# Patient Record
Sex: FEMALE | Race: WHITE | NOT HISPANIC OR LATINO | Employment: FULL TIME | URBAN - METROPOLITAN AREA
[De-identification: names, ages, dates, MRNs, and addresses within clinical notes are randomized per-mention and may not be internally consistent; named-entity substitution may affect disease eponyms.]

---

## 2022-06-20 PROCEDURE — 87086 URINE CULTURE/COLONY COUNT: CPT

## 2022-09-01 ENCOUNTER — TRANSCRIBE ORDERS (OUTPATIENT)
Dept: ADMINISTRATIVE | Facility: HOSPITAL | Age: 52
End: 2022-09-01

## 2022-09-01 DIAGNOSIS — Z12.31 SCREENING MAMMOGRAM FOR BREAST CANCER: Primary | ICD-10-CM

## 2023-02-13 ENCOUNTER — OFFICE VISIT (OUTPATIENT)
Dept: ORTHOPEDIC SURGERY | Facility: CLINIC | Age: 53
End: 2023-02-13
Payer: OTHER MISCELLANEOUS

## 2023-02-13 VITALS — HEART RATE: 78 BPM | WEIGHT: 250 LBS | HEIGHT: 67 IN | BODY MASS INDEX: 39.24 KG/M2

## 2023-02-13 DIAGNOSIS — M25.511 ACUTE PAIN OF RIGHT SHOULDER: Primary | ICD-10-CM

## 2023-02-13 PROCEDURE — 99203 OFFICE O/P NEW LOW 30 MIN: CPT | Performed by: ORTHOPAEDIC SURGERY

## 2023-02-13 RX ORDER — CYCLOBENZAPRINE HCL 10 MG
TABLET ORAL
COMMUNITY
Start: 2023-02-06 | End: 2023-03-31 | Stop reason: HOSPADM

## 2023-02-13 RX ORDER — ORAL SEMAGLUTIDE 7 MG/1
TABLET ORAL
COMMUNITY
Start: 2022-12-16

## 2023-02-13 RX ORDER — HYDROXYZINE HYDROCHLORIDE 25 MG/1
TABLET, FILM COATED ORAL
COMMUNITY
Start: 2022-05-11

## 2023-02-13 RX ORDER — MELOXICAM 15 MG/1
15 TABLET ORAL DAILY PRN
Qty: 30 TABLET | Refills: 4 | Status: SHIPPED | OUTPATIENT
Start: 2023-02-13 | End: 2023-03-31 | Stop reason: HOSPADM

## 2023-02-13 NOTE — PROGRESS NOTES
"     Patient ID: Amanda Guzman is a 52 y.o. female.    Chief Complaint:    Chief Complaint   Patient presents with   • Right Shoulder - Pain, Initial Evaluation     Pain 8 DOI 1/30/2023       HPI:  This is a 52-year-old female who fell at work on January 30 injuring her right arm.  She feels like she cannot lift her arm.  She has pain from her shoulder into her bicep into her arm and wrist.  She has been taking oral medication and been on light duty  Past Medical History:   Diagnosis Date   • Asthma    • Depression        Past Surgical History:   Procedure Laterality Date   • CHOLECYSTECTOMY     • NASAL SINUS SURGERY Bilateral 2017       History reviewed. No pertinent family history.       Social History     Occupational History   • Not on file   Tobacco Use   • Smoking status: Never   • Smokeless tobacco: Never   Vaping Use   • Vaping Use: Never used   Substance and Sexual Activity   • Alcohol use: Yes     Comment: occassionally   • Drug use: Defer   • Sexual activity: Defer      Review of Systems   Cardiovascular: Negative for chest pain.   Musculoskeletal: Positive for arthralgias.       Objective:    Pulse 78   Ht 170.2 cm (67\")   Wt 113 kg (250 lb)   BMI 39.16 kg/m²     Physical Examination:  Right arm demonstrates intact skin no deformity she has pain to essentially palpation of all bony prominences of the right upper extremity she has full range of motion the elbow wrist and digits she has passive shoulder elevation of 150 abduction 120 external rotation 30 with pain and weakness on Speed, [place], supraspinatus testing.  Belly press and liftoff are 4/5.  Sensory and motor exam are intact all distributions. Radial pulse is palpable and capillary refill is less than two seconds to all digits.    Imaging:  Prior x-rays of the shoulder humerus elbow and wrist are reviewed by interpretation is of no fracture well-maintained joint spaces    Assessment:  Right shoulder pain possible cuff tear    Plan:  I recommend " MRI      Procedures         Disclaimer: Part of this note may be an electronic transcription/translation of spoken language to printed text using the Dragon Dictation System

## 2023-02-13 NOTE — PATIENT INSTRUCTIONS
MRI follow-up instructions    Today at your office visit, Dr. Goode recommended an MRI (magnetic resonance imaging) to evaluate your joint pain.  This requires a precertification process, which our office will do, and then we will contact you when it is approved and go over scheduling options.  We typically recommend these to be performed at [facility] or [facility].  If for some reason it is performed elsewhere please arrange to have that facility give you a disc with your images on it so Dr. Goode can review it at your follow-up visit.    When checking out today we recommend making an appointment to go over your results in approximately two weeks.  If your MRI is done sooner than that we would be happy to schedule you sooner to go over your results, just contact us at [phone] or through the [employer] portal to let us know your MRI is completed.  Seeing you in person for the results gives us the best opportunity to look at your images together and explain the diagnosis and treatment options to best help you.

## 2023-02-14 ENCOUNTER — TELEPHONE (OUTPATIENT)
Dept: ORTHOPEDIC SURGERY | Facility: CLINIC | Age: 53
End: 2023-02-14

## 2023-02-14 NOTE — TELEPHONE ENCOUNTER
Caller: Amanda Guzman    Relationship: Self    Best call back number:     What is the best time to reach you: ANY    Who are you requesting to speak with (clinical staff, provider,  specific staff member): CLINICAL    What was the call regarding: PATIENT CALL;ED ASKING IF WE HAVE [employer] COMP APPROVAL TO SCHEDULE MRI.  SHE ASKED IF WE HAD FAX YET.   I ADVISED I DONT HAVE THAT INFO.  SHE IS THINKING THAT WE HAD TO SEND FAX TO [employer] TO GET APPROVAL BUT TRIED TO EXPLAIN [employer] USUALLY SENDS US APPROVALS    Do you require a callback: YES

## 2023-02-15 ENCOUNTER — TELEPHONE (OUTPATIENT)
Dept: ORTHOPEDIC SURGERY | Facility: CLINIC | Age: 53
End: 2023-02-15
Payer: COMMERCIAL

## 2023-02-15 NOTE — TELEPHONE ENCOUNTER
PATIENT LVM SO I RETURNED PATIENTS CALL. CHASE THAT I DID GET HER VM AND WAS CALLING TO SCHEDULE MRI FOLLOW UP AND TO CALL US [phone].

## 2023-02-15 NOTE — TELEPHONE ENCOUNTER
Caller: ANIYAH ZUNIGA    Relationship to patient: SELF    Best call back number: [phone]    Patient is needing: PATIENT WOULD LIKE TO KNOW IF AN MRI OF THE RIGHT SHOULDER CAN BE ORDERED FOR A SOONER DATE.  SHE IS EXPERIENCING INCREASING PAIN.  PLEASE CALL PATIENT BACK TO DISCUSS.

## 2023-02-15 NOTE — TELEPHONE ENCOUNTER
I spoke with patient and she contacted scheduling and was put onto a cancellation list.  She was transferred to make appointment with Dr Goode after her MRI.

## 2023-02-16 ENCOUNTER — TELEPHONE (OUTPATIENT)
Dept: ORTHOPEDIC SURGERY | Facility: CLINIC | Age: 53
End: 2023-02-16

## 2023-02-24 ENCOUNTER — HOSPITAL ENCOUNTER (OUTPATIENT)
Dept: MRI IMAGING | Facility: HOSPITAL | Age: 53
Discharge: HOME | End: 2023-02-24
Admitting: ORTHOPAEDIC SURGERY
Payer: OTHER MISCELLANEOUS

## 2023-02-24 DIAGNOSIS — M25.511 ACUTE PAIN OF RIGHT SHOULDER: ICD-10-CM

## 2023-02-24 PROCEDURE — 73221 MRI JOINT UPR EXTREM W/O DYE: CPT

## 2023-02-27 ENCOUNTER — PREP FOR SURGERY (OUTPATIENT)
Dept: OTHER | Facility: HOSPITAL | Age: 53
End: 2023-02-27
Payer: COMMERCIAL

## 2023-02-27 ENCOUNTER — OFFICE VISIT (OUTPATIENT)
Dept: ORTHOPEDIC SURGERY | Facility: CLINIC | Age: 53
End: 2023-02-27
Payer: OTHER MISCELLANEOUS

## 2023-02-27 VITALS — HEART RATE: 91 BPM | HEIGHT: 67 IN | WEIGHT: 250 LBS | BODY MASS INDEX: 39.24 KG/M2

## 2023-02-27 DIAGNOSIS — M75.121 COMPLETE TEAR OF RIGHT ROTATOR CUFF, UNSPECIFIED WHETHER TRAUMATIC: Primary | ICD-10-CM

## 2023-02-27 PROCEDURE — 99214 OFFICE O/P EST MOD 30 MIN: CPT | Performed by: ORTHOPAEDIC SURGERY

## 2023-02-27 PROCEDURE — 97760 ORTHOTIC MGMT&TRAING 1ST ENC: CPT | Performed by: ORTHOPAEDIC SURGERY

## 2023-02-27 NOTE — PROGRESS NOTES
"     Patient ID: Amanda Guzman is a 52 y.o. female.  Right shoulder pain, pain is worsening    Review of Systems:        Objective:    Pulse 91   Ht 170.2 cm (67\")   Wt 113 kg (250 lb)   BMI 39.16 kg/m²     Physical Examination:   Right arm demonstrates intact skin no deformity she has pain to essentially palpation of all bony prominences of the right upper extremity she has full range of motion the elbow wrist and digits she has passive shoulder elevation of 150 abduction 120 external rotation 30 with pain and weakness on Speed, [place], supraspinatus testing.  Belly press and liftoff are 4/5.  Sensory and motor exam are intact all distributions. Radial pulse is palpable and capillary refill is less than two seconds to all digits.       Imaging:   MRI reviewed my interpretation is of full-thickness tear of the anterior supraspinatus    Assessment:    Right shoulder full-thickness cuff tear    Plan:   Treatment options discussed she would like proceed with right shoulder arthroscopy rotator cuff repair.  Risks and benefits, specifically risks of bleeding, scar, infection, fracture, stiffness, retear, nerve, tendon or artery damage, the need for further surgery, DVT, and loss of life or limb and rehab were discussed. All questions were answered and addressed.  Postop sling dispensed  Greater than 15 minutes was spent demonstrating proper fit and use of the device and signs to monitor for complications  Continue light duty with no medicine drawls until surgery likely will need a couple weeks off after surgery before returning to light duty      Procedures          Disclaimer: Part of this note may be an electronic transcription/translation of spoken language to printed text using the Dragon Dictation System  "

## 2023-02-28 ENCOUNTER — TELEPHONE (OUTPATIENT)
Dept: ORTHOPEDIC SURGERY | Facility: CLINIC | Age: 53
End: 2023-02-28
Payer: COMMERCIAL

## 2023-03-01 NOTE — TELEPHONE ENCOUNTER
PT PROVIDED DIFFERENT NUMBER TO CONTACT W/C  FOR SURGERY AUTH. PLEASE CALL [phone]  EXT [phone]. PT WOULD LIKE A CALL WHEN W/C APPROVED SX.

## 2023-03-02 NOTE — TELEPHONE ENCOUNTER
CALLED LM FOR [employer] NEEDING FAX # TO SEND RECORDS TO REQUEST APPROVAL FOR SURGERY. WAITING TO HEAR BACK FROM [employer] DRAKE SAID SHE IS OUT OF THE OFFICE UNTIL 3/3

## 2023-03-03 DIAGNOSIS — M75.121 COMPLETE TEAR OF RIGHT ROTATOR CUFF, UNSPECIFIED WHETHER TRAUMATIC: Primary | ICD-10-CM

## 2023-03-03 RX ORDER — MELOXICAM 15 MG/1
15 TABLET ORAL DAILY PRN
Qty: 30 TABLET | Refills: 4 | Status: SHIPPED | OUTPATIENT
Start: 2023-03-03 | End: 2023-03-31 | Stop reason: HOSPADM

## 2023-03-03 RX ORDER — TRAMADOL HYDROCHLORIDE 50 MG/1
50 TABLET ORAL EVERY 6 HOURS PRN
Qty: 28 TABLET | Refills: 0 | Status: SHIPPED | OUTPATIENT
Start: 2023-03-03 | End: 2023-03-03 | Stop reason: SDUPTHER

## 2023-03-03 RX ORDER — TRAMADOL HYDROCHLORIDE 50 MG/1
50 TABLET ORAL EVERY 6 HOURS PRN
Qty: 28 TABLET | Refills: 0 | Status: SHIPPED | OUTPATIENT
Start: 2023-03-03 | End: 2023-03-31 | Stop reason: HOSPADM

## 2023-03-20 ENCOUNTER — LAB (OUTPATIENT)
Dept: LAB | Facility: HOSPITAL | Age: 53
End: 2023-03-20
Payer: OTHER MISCELLANEOUS

## 2023-03-20 ENCOUNTER — HOSPITAL ENCOUNTER (OUTPATIENT)
Dept: CARDIOLOGY | Facility: HOSPITAL | Age: 53
Discharge: HOME | End: 2023-03-20
Admitting: ORTHOPAEDIC SURGERY
Payer: OTHER MISCELLANEOUS

## 2023-03-20 DIAGNOSIS — M75.121 COMPLETE TEAR OF RIGHT ROTATOR CUFF, UNSPECIFIED WHETHER TRAUMATIC: ICD-10-CM

## 2023-03-20 LAB
ANION GAP SERPL CALCULATED.3IONS-SCNC: 10 MMOL/L (ref 5–15)
APTT PPP: 29.6 SECONDS (ref 24–31)
BASOPHILS # BLD AUTO: 0.09 10*3/MM3 (ref 0–0.2)
BASOPHILS NFR BLD AUTO: 0.8 % (ref 0–1.5)
BUN SERPL-MCNC: 13 MG/DL (ref 6–20)
BUN/CREAT SERPL: 13.5 (ref 7–25)
CALCIUM SPEC-SCNC: 9.6 MG/DL (ref 8.6–10.5)
CHLORIDE SERPL-SCNC: 101 MMOL/L (ref 98–107)
CO2 SERPL-SCNC: 29 MMOL/L (ref 22–29)
CREAT SERPL-MCNC: 0.96 MG/DL (ref 0.57–1)
DEPRECATED RDW RBC AUTO: 44.3 FL (ref 37–54)
EGFRCR SERPLBLD CKD-EPI 2021: 70.9 ML/MIN/1.73
EOSINOPHIL # BLD AUTO: 0.52 10*3/MM3 (ref 0–0.4)
EOSINOPHIL NFR BLD AUTO: 4.8 % (ref 0.3–6.2)
ERYTHROCYTE [DISTWIDTH] IN BLOOD BY AUTOMATED COUNT: 14.3 % (ref 12.3–15.4)
GLUCOSE SERPL-MCNC: 224 MG/DL (ref 65–99)
HCT VFR BLD AUTO: 43.4 % (ref 34–46.6)
HGB BLD-MCNC: 14.1 G/DL (ref 12–15.9)
IMM GRANULOCYTES # BLD AUTO: 0.06 10*3/MM3 (ref 0–0.05)
IMM GRANULOCYTES NFR BLD AUTO: 0.6 % (ref 0–0.5)
INR PPP: 1 (ref 0.93–1.1)
LYMPHOCYTES # BLD AUTO: 2.4 10*3/MM3 (ref 0.7–3.1)
LYMPHOCYTES NFR BLD AUTO: 22.3 % (ref 19.6–45.3)
MCH RBC QN AUTO: 27.9 PG (ref 26.6–33)
MCHC RBC AUTO-ENTMCNC: 32.5 G/DL (ref 31.5–35.7)
MCV RBC AUTO: 85.9 FL (ref 79–97)
MONOCYTES # BLD AUTO: 0.72 10*3/MM3 (ref 0.1–0.9)
MONOCYTES NFR BLD AUTO: 6.7 % (ref 5–12)
NEUTROPHILS NFR BLD AUTO: 6.99 10*3/MM3 (ref 1.7–7)
NEUTROPHILS NFR BLD AUTO: 64.8 % (ref 42.7–76)
NRBC BLD AUTO-RTO: 0.1 /100 WBC (ref 0–0.2)
PLATELET # BLD AUTO: 280 10*3/MM3 (ref 140–450)
PMV BLD AUTO: 11 FL (ref 6–12)
POTASSIUM SERPL-SCNC: 4.4 MMOL/L (ref 3.5–5.2)
PROTHROMBIN TIME: 10.3 SECONDS (ref 9.6–11.7)
RBC # BLD AUTO: 5.05 10*6/MM3 (ref 3.77–5.28)
SODIUM SERPL-SCNC: 140 MMOL/L (ref 136–145)
WBC NRBC COR # BLD: 10.78 10*3/MM3 (ref 3.4–10.8)

## 2023-03-20 PROCEDURE — 80048 BASIC METABOLIC PNL TOTAL CA: CPT

## 2023-03-20 PROCEDURE — 85610 PROTHROMBIN TIME: CPT

## 2023-03-20 PROCEDURE — 36415 COLL VENOUS BLD VENIPUNCTURE: CPT

## 2023-03-20 PROCEDURE — 93005 ELECTROCARDIOGRAM TRACING: CPT | Performed by: ORTHOPAEDIC SURGERY

## 2023-03-20 PROCEDURE — 85025 COMPLETE CBC W/AUTO DIFF WBC: CPT

## 2023-03-20 PROCEDURE — 93010 ELECTROCARDIOGRAM REPORT: CPT | Performed by: INTERNAL MEDICINE

## 2023-03-20 PROCEDURE — 85730 THROMBOPLASTIN TIME PARTIAL: CPT

## 2023-03-23 LAB — QT INTERVAL: 364 MS

## 2023-03-30 ENCOUNTER — ANESTHESIA EVENT (OUTPATIENT)
Dept: PERIOP | Facility: HOSPITAL | Age: 53
End: 2023-03-30
Payer: OTHER MISCELLANEOUS

## 2023-03-30 RX ORDER — PROMETHAZINE HYDROCHLORIDE 12.5 MG/1
12.5 TABLET ORAL EVERY 6 HOURS PRN
Qty: 21 TABLET | Refills: 1 | Status: SHIPPED | OUTPATIENT
Start: 2023-03-30

## 2023-03-30 RX ORDER — NAPROXEN 500 MG/1
500 TABLET ORAL 2 TIMES DAILY WITH MEALS
Qty: 28 TABLET | Refills: 0 | Status: SHIPPED | OUTPATIENT
Start: 2023-03-30

## 2023-03-30 RX ORDER — CEPHALEXIN 500 MG/1
500 CAPSULE ORAL 4 TIMES DAILY
Qty: 4 CAPSULE | Refills: 0 | Status: SHIPPED | OUTPATIENT
Start: 2023-03-30 | End: 2023-03-31

## 2023-03-30 RX ORDER — OXYCODONE HYDROCHLORIDE AND ACETAMINOPHEN 5; 325 MG/1; MG/1
1 TABLET ORAL EVERY 6 HOURS PRN
Qty: 28 TABLET | Refills: 0 | Status: SHIPPED | OUTPATIENT
Start: 2023-03-30

## 2023-03-30 NOTE — H&P
[facility]   HISTORY AND PHYSICAL    Patient Name: Amanda Guzman  : 1970  MRN: [id number]  Primary Care Physician:  Miriam Stacy MD  Date of admission: (Not on file)    Subjective   Subjective     Chief Complaint: Right shoulder pain    This is a 52-year-old female presenting with continued right shoulder pain after work injury for shoulder arthroscopy possible cuff repair        Review of Systems   Cardiovascular: Negative for chest pain.   Musculoskeletal: Positive for arthralgias.        Personal History     Past Medical History:   Diagnosis Date   • Asthma    • Depression    • Diabetes mellitus (HCC)    • Obese        Past Surgical History:   Procedure Laterality Date   • CHOLECYSTECTOMY     • NASAL SINUS SURGERY Bilateral 2017       Family History: family history is not on file. Otherwise pertinent FHx was reviewed and not pertinent to current issue.    Social History:  reports that she has never smoked. She has never used smokeless tobacco. She reports current alcohol use. Drug use questions deferred to the physician.    Home Medications:  FLUoxetine, Semaglutide, albuterol sulfate HFA, cyclobenzaprine, fluticasone, hydrOXYzine, loratadine, meloxicam, metFORMIN, montelukast, ondansetron, oxybutynin XL, traMADol, and traZODone    Allergies:  No Known Allergies    Objective    Objective     Vitals:        Right arm demonstrates intact skin no deformity she has pain to essentially palpation of all bony prominences of the right upper extremity she has full range of motion the elbow wrist and digits she has passive shoulder elevation of 150 abduction 120 external rotation 30 with pain and weakness on Speed, [place], supraspinatus testing.  Belly press and liftoff are 4/5.  Sensory and motor exam are intact all distributions. Radial pulse is palpable and capillary refill is less than two seconds to all digits.        Imaging:   MRI reviewed my interpretation is of full-thickness tear of the  anterior supraspinatus     Assessment:    Right shoulder full-thickness cuff tear     Plan:   Treatment options discussed she would like proceed with right shoulder arthroscopy rotator cuff repair.  Risks and benefits, specifically risks of bleeding, scar, infection, fracture, stiffness, retear, nerve, tendon or artery damage, the need for further surgery, DVT, and loss of life or limb and rehab were discussed. All questions were answered and addressed.  Postop sling dispensed  Greater than 15 minutes was spent demonstrating proper fit and use of the device and signs to monitor for complications  Continue light duty with no medicine drawls until surgery likely will need a couple weeks off after surgery before returning to light duty  Justin Goode MD

## 2023-03-30 NOTE — ANESTHESIA PREPROCEDURE EVALUATION
Anesthesia Evaluation     Patient summary reviewed and Nursing notes reviewed   no history of anesthetic complications:  NPO Solid Status: > 8 hours  NPO Liquid Status: > 2 hours           Airway   Dental      Pulmonary    (+) asthma,  Cardiovascular     ECG reviewed        Neuro/Psych  (+) psychiatric history Depression,    GI/Hepatic/Renal/Endo    (+) obesity,   diabetes mellitus,     Musculoskeletal     Abdominal    Substance History      OB/GYN          Other        ROS/Med Hx Other: Additional History:  Hyperglycemia, allergies    Stress: (2015)  Impression:   1. Abnormal left ventricular perfusion without definite wall motion   abnormality. Findings are suggestive of a mild to moderate area of   ischemia in the anterior and anterior septal body and base of the left   ventricle.   2. Left ventricular ejection fractions of 74% and 76% at rest and stress   respectively. Please see end-systolic and end-diastolic volume data below.       PSH:  CHOLECYSTECTOMY NASAL SINUS SURGERY                   Anesthesia Plan    ASA 3     general with block     (Patient identified; pre-operative vital signs, all relevant labs/studies, complete medical/surgical/anesthetic history, full medication list, full allergy list, and NPO status obtained/reviewed; physical assessment performed; anesthetic options, side effects, potential complications, risks, and benefits discussed; questions answered; written anesthesia consent obtained; patient cleared for procedure; anesthesia machine and equipment checked and functioning)  intravenous induction     Anesthetic plan, risks, benefits, and alternatives have been provided, discussed and informed consent has been obtained with: patient.    Plan discussed with CRNA and CAA.        CODE STATUS:

## 2023-03-31 ENCOUNTER — HOSPITAL ENCOUNTER (OUTPATIENT)
Facility: HOSPITAL | Age: 53
Setting detail: HOSPITAL OUTPATIENT SURGERY
Discharge: HOME | End: 2023-03-31
Attending: ORTHOPAEDIC SURGERY | Admitting: ORTHOPAEDIC SURGERY
Payer: OTHER MISCELLANEOUS

## 2023-03-31 ENCOUNTER — ANESTHESIA (OUTPATIENT)
Dept: PERIOP | Facility: HOSPITAL | Age: 53
End: 2023-03-31
Payer: OTHER MISCELLANEOUS

## 2023-03-31 VITALS
HEIGHT: 67 IN | DIASTOLIC BLOOD PRESSURE: 74 MMHG | TEMPERATURE: 98.1 F | BODY MASS INDEX: 37.51 KG/M2 | SYSTOLIC BLOOD PRESSURE: 126 MMHG | HEART RATE: 92 BPM | OXYGEN SATURATION: 96 % | RESPIRATION RATE: 16 BRPM | WEIGHT: 239 LBS

## 2023-03-31 DIAGNOSIS — M75.121 COMPLETE TEAR OF RIGHT ROTATOR CUFF, UNSPECIFIED WHETHER TRAUMATIC: Primary | ICD-10-CM

## 2023-03-31 LAB
GLUCOSE BLDC GLUCOMTR-MCNC: 123 MG/DL (ref 70–105)
GLUCOSE BLDC GLUCOMTR-MCNC: 138 MG/DL (ref 70–105)

## 2023-03-31 PROCEDURE — 25010000002 KETOROLAC TROMETHAMINE PER 15 MG: Performed by: NURSE ANESTHETIST, CERTIFIED REGISTERED

## 2023-03-31 PROCEDURE — C1713 ANCHOR/SCREW BN/BN,TIS/BN: HCPCS | Performed by: ORTHOPAEDIC SURGERY

## 2023-03-31 PROCEDURE — 25010000002 CEFAZOLIN PER 500 MG: Performed by: ORTHOPAEDIC SURGERY

## 2023-03-31 PROCEDURE — 25010000002 EPINEPHRINE PER 0.1 MG: Performed by: ORTHOPAEDIC SURGERY

## 2023-03-31 PROCEDURE — 29827 SHO ARTHRS SRG RT8TR CUF RPR: CPT | Performed by: PHYSICIAN ASSISTANT

## 2023-03-31 PROCEDURE — 25010000002 ONDANSETRON PER 1 MG: Performed by: ORTHOPAEDIC SURGERY

## 2023-03-31 PROCEDURE — 0 LIDOCAINE 1 % SOLUTION 10 ML VIAL: Performed by: ORTHOPAEDIC SURGERY

## 2023-03-31 PROCEDURE — C9290 INJ, BUPIVACAINE LIPOSOME: HCPCS | Performed by: ANESTHESIOLOGY

## 2023-03-31 PROCEDURE — 25010000002 PROPOFOL 200 MG/20ML EMULSION: Performed by: NURSE ANESTHETIST, CERTIFIED REGISTERED

## 2023-03-31 PROCEDURE — 29827 SHO ARTHRS SRG RT8TR CUF RPR: CPT | Performed by: ORTHOPAEDIC SURGERY

## 2023-03-31 PROCEDURE — 25010000002 MIDAZOLAM PER 1 MG: Performed by: ANESTHESIOLOGY

## 2023-03-31 PROCEDURE — 25010000002 ONDANSETRON PER 1 MG: Performed by: NURSE ANESTHETIST, CERTIFIED REGISTERED

## 2023-03-31 PROCEDURE — 82962 GLUCOSE BLOOD TEST: CPT

## 2023-03-31 PROCEDURE — 0 BUPIVACAINE LIPOSOME 1.3 % SUSPENSION: Performed by: ANESTHESIOLOGY

## 2023-03-31 DEVICE — 5.5 MM ARGO KNOTLESS ANCHOR
Type: IMPLANTABLE DEVICE | Site: SHOULDER | Status: FUNCTIONAL
Brand: ARGO KNOTLESS

## 2023-03-31 DEVICE — HI-FI® PASSING LOOP
Type: IMPLANTABLE DEVICE | Site: SHOULDER | Status: FUNCTIONAL
Brand: HI-FI

## 2023-03-31 DEVICE — 2MM HI-FI® TAPE BLUE
Type: IMPLANTABLE DEVICE | Site: SHOULDER | Status: FUNCTIONAL
Brand: HI-FI

## 2023-03-31 RX ORDER — DIPHENHYDRAMINE HYDROCHLORIDE 50 MG/ML
12.5 INJECTION INTRAMUSCULAR; INTRAVENOUS
Status: DISCONTINUED | OUTPATIENT
Start: 2023-03-31 | End: 2023-03-31 | Stop reason: HOSPADM

## 2023-03-31 RX ORDER — KETAMINE HCL IN NACL, ISO-OSM 100MG/10ML
SYRINGE (ML) INJECTION AS NEEDED
Status: DISCONTINUED | OUTPATIENT
Start: 2023-03-31 | End: 2023-03-31 | Stop reason: SURG

## 2023-03-31 RX ORDER — FENTANYL CITRATE 50 UG/ML
50 INJECTION, SOLUTION INTRAMUSCULAR; INTRAVENOUS
Status: DISCONTINUED | OUTPATIENT
Start: 2023-03-31 | End: 2023-03-31 | Stop reason: HOSPADM

## 2023-03-31 RX ORDER — PROMETHAZINE HYDROCHLORIDE 25 MG/1
25 TABLET ORAL ONCE AS NEEDED
Status: DISCONTINUED | OUTPATIENT
Start: 2023-03-31 | End: 2023-03-31 | Stop reason: HOSPADM

## 2023-03-31 RX ORDER — SODIUM CHLORIDE 0.9 % (FLUSH) 0.9 %
10 SYRINGE (ML) INJECTION AS NEEDED
Status: DISCONTINUED | OUTPATIENT
Start: 2023-03-31 | End: 2023-03-31 | Stop reason: HOSPADM

## 2023-03-31 RX ORDER — ONDANSETRON 2 MG/ML
4 INJECTION INTRAMUSCULAR; INTRAVENOUS ONCE AS NEEDED
Status: COMPLETED | OUTPATIENT
Start: 2023-03-31 | End: 2023-03-31

## 2023-03-31 RX ORDER — OXYCODONE HYDROCHLORIDE 5 MG/1
10 TABLET ORAL ONCE AS NEEDED
Status: DISCONTINUED | OUTPATIENT
Start: 2023-03-31 | End: 2023-03-31 | Stop reason: HOSPADM

## 2023-03-31 RX ORDER — ONDANSETRON 2 MG/ML
INJECTION INTRAMUSCULAR; INTRAVENOUS AS NEEDED
Status: DISCONTINUED | OUTPATIENT
Start: 2023-03-31 | End: 2023-03-31 | Stop reason: SURG

## 2023-03-31 RX ORDER — SODIUM CHLORIDE, SODIUM LACTATE, POTASSIUM CHLORIDE, CALCIUM CHLORIDE 600; 310; 30; 20 MG/100ML; MG/100ML; MG/100ML; MG/100ML
1000 INJECTION, SOLUTION INTRAVENOUS CONTINUOUS
Status: DISCONTINUED | OUTPATIENT
Start: 2023-03-31 | End: 2023-03-31 | Stop reason: HOSPADM

## 2023-03-31 RX ORDER — PROMETHAZINE HYDROCHLORIDE 25 MG/1
25 SUPPOSITORY RECTAL ONCE AS NEEDED
Status: DISCONTINUED | OUTPATIENT
Start: 2023-03-31 | End: 2023-03-31 | Stop reason: HOSPADM

## 2023-03-31 RX ORDER — BUPIVACAINE HYDROCHLORIDE 5 MG/ML
INJECTION, SOLUTION EPIDURAL; INTRACAUDAL
Status: COMPLETED | OUTPATIENT
Start: 2023-03-31 | End: 2023-03-31

## 2023-03-31 RX ORDER — PROPOFOL 10 MG/ML
INJECTION, EMULSION INTRAVENOUS AS NEEDED
Status: DISCONTINUED | OUTPATIENT
Start: 2023-03-31 | End: 2023-03-31 | Stop reason: SURG

## 2023-03-31 RX ORDER — ACETAMINOPHEN 500 MG
TABLET ORAL AS NEEDED
Status: DISCONTINUED | OUTPATIENT
Start: 2023-03-31 | End: 2023-03-31 | Stop reason: SURG

## 2023-03-31 RX ORDER — ACETAMINOPHEN 650 MG/1
650 SUPPOSITORY RECTAL ONCE AS NEEDED
Status: DISCONTINUED | OUTPATIENT
Start: 2023-03-31 | End: 2023-03-31 | Stop reason: HOSPADM

## 2023-03-31 RX ORDER — DROPERIDOL 2.5 MG/ML
0.62 INJECTION, SOLUTION INTRAMUSCULAR; INTRAVENOUS ONCE AS NEEDED
Status: DISCONTINUED | OUTPATIENT
Start: 2023-03-31 | End: 2023-03-31 | Stop reason: HOSPADM

## 2023-03-31 RX ORDER — LIDOCAINE HYDROCHLORIDE 10 MG/ML
0.5 INJECTION, SOLUTION INFILTRATION; PERINEURAL ONCE AS NEEDED
Status: DISCONTINUED | OUTPATIENT
Start: 2023-03-31 | End: 2023-03-31 | Stop reason: HOSPADM

## 2023-03-31 RX ORDER — MIDAZOLAM HYDROCHLORIDE 1 MG/ML
INJECTION INTRAMUSCULAR; INTRAVENOUS AS NEEDED
Status: DISCONTINUED | OUTPATIENT
Start: 2023-03-31 | End: 2023-03-31 | Stop reason: SURG

## 2023-03-31 RX ORDER — ACETAMINOPHEN 325 MG/1
650 TABLET ORAL ONCE AS NEEDED
Status: DISCONTINUED | OUTPATIENT
Start: 2023-03-31 | End: 2023-03-31 | Stop reason: HOSPADM

## 2023-03-31 RX ORDER — LIDOCAINE HYDROCHLORIDE 20 MG/ML
INJECTION, SOLUTION EPIDURAL; INFILTRATION; INTRACAUDAL; PERINEURAL AS NEEDED
Status: DISCONTINUED | OUTPATIENT
Start: 2023-03-31 | End: 2023-03-31 | Stop reason: SURG

## 2023-03-31 RX ORDER — ROCURONIUM BROMIDE 10 MG/ML
INJECTION, SOLUTION INTRAVENOUS AS NEEDED
Status: DISCONTINUED | OUTPATIENT
Start: 2023-03-31 | End: 2023-03-31 | Stop reason: SURG

## 2023-03-31 RX ORDER — KETOROLAC TROMETHAMINE 30 MG/ML
INJECTION, SOLUTION INTRAMUSCULAR; INTRAVENOUS AS NEEDED
Status: DISCONTINUED | OUTPATIENT
Start: 2023-03-31 | End: 2023-03-31 | Stop reason: SURG

## 2023-03-31 RX ADMIN — BUPIVACAINE HYDROCHLORIDE 10 ML: 5 INJECTION, SOLUTION EPIDURAL; INTRACAUDAL; PERINEURAL at 13:24

## 2023-03-31 RX ADMIN — LIDOCAINE HYDROCHLORIDE 100 MG: 20 INJECTION, SOLUTION EPIDURAL; INFILTRATION; INTRACAUDAL; PERINEURAL at 14:10

## 2023-03-31 RX ADMIN — Medication 10 MG: at 15:15

## 2023-03-31 RX ADMIN — CEFAZOLIN 2 G: 2 INJECTION, POWDER, FOR SOLUTION INTRAMUSCULAR; INTRAVENOUS at 14:11

## 2023-03-31 RX ADMIN — ONDANSETRON 4 MG: 2 INJECTION INTRAMUSCULAR; INTRAVENOUS at 16:28

## 2023-03-31 RX ADMIN — SODIUM CHLORIDE, POTASSIUM CHLORIDE, SODIUM LACTATE AND CALCIUM CHLORIDE 1000 ML: 600; 310; 30; 20 INJECTION, SOLUTION INTRAVENOUS at 12:35

## 2023-03-31 RX ADMIN — Medication 20 MG: at 14:43

## 2023-03-31 RX ADMIN — Medication 10 MG: at 15:00

## 2023-03-31 RX ADMIN — PROPOFOL 200 MG: 10 INJECTION, EMULSION INTRAVENOUS at 14:10

## 2023-03-31 RX ADMIN — ACETAMINOPHEN 1000 MG: 500 TABLET, FILM COATED ORAL at 13:19

## 2023-03-31 RX ADMIN — ROCURONIUM BROMIDE 50 MG: 10 INJECTION, SOLUTION INTRAVENOUS at 14:10

## 2023-03-31 RX ADMIN — SUGAMMADEX 200 MG: 100 INJECTION, SOLUTION INTRAVENOUS at 15:17

## 2023-03-31 RX ADMIN — KETOROLAC TROMETHAMINE 30 MG: 30 INJECTION, SOLUTION INTRAMUSCULAR; INTRAVENOUS at 15:15

## 2023-03-31 RX ADMIN — BUPIVACAINE 10 ML: 13.3 INJECTION, SUSPENSION, LIPOSOMAL INFILTRATION at 13:24

## 2023-03-31 RX ADMIN — ONDANSETRON 4 MG: 2 INJECTION INTRAMUSCULAR; INTRAVENOUS at 15:15

## 2023-03-31 RX ADMIN — MIDAZOLAM 2 MG: 1 INJECTION INTRAMUSCULAR; INTRAVENOUS at 13:20

## 2023-03-31 RX ADMIN — Medication 10 MG: at 15:20

## 2023-03-31 NOTE — ANESTHESIA PROCEDURE NOTES
Airway  Urgency: elective    Date/Time: 3/31/2023 2:13 PM  Difficult airway    General Information and Staff    Patient location during procedure: OR  CRNA/CAA: Miriam Sanders CRNA    Indications and Patient Condition  Indications for airway management: airway protection    Preoxygenated: yes  Mask difficulty assessment: 1 - vent by mask    Final Airway Details  Final airway type: endotracheal airway      Successful airway: ETT  Cuffed: yes   Successful intubation technique: video laryngoscopy and direct laryngoscopy  Facilitating devices/methods: Bougie and intubating stylet  Endotracheal tube insertion site: oral  Blade: Machado  Blade size: 3  ETT size (mm): 7.0  Cormack-Lehane Classification: grade III - view of epiglottis only  Placement verified by: chest auscultation and capnometry   Cuff volume (mL): 7  Measured from: lips  ETT/EBT  to lips (cm): 22  Number of attempts at approach: 3 or more  Assessment: lips, teeth, and gum same as pre-op and atraumatic intubation    Additional Comments  Airway very anterior. Attempted with Choi 2. Only epiglottis seen. Attempted with Machado. Grade I view with Machado. Unable to angle tube to pass through cords. Bougie placed with Machado tube inserted over bougie without difficulty.

## 2023-03-31 NOTE — OP NOTE
SHOULDER ARTHROSCOPY WITH ROTATOR CUFF REPAIR  Procedure Report    Patient Name:  Amanda Guzman  YOB: 1970    Date of Surgery:  3/31/2023     Indications: This is a 52 y.o. female with pain to the right shoulder.  Imaging demonstrated rotator cuff tear.Treatment options were discussed.  They desired to proceed with shoulder arthroscopy with rotator cuff repair after discussing the risks including bleeding, scarring, infection, stiffness, nerve damage, tendon damage, artery damage, continued pain, DVT, loss of life or limb, and a need for further surgery.      Pre-op Diagnosis:   Complete tear of right rotator cuff, unspecified whether traumatic [M75.121]       Post-op Diagnosis:    Post-Op Diagnosis Codes:     * Complete tear of right rotator cuff, unspecified whether traumatic [M75.121]    Procedure/CPT® Codes: 91314    Procedure(s): Right  SHOULDER ARTHROSCOPY WITH ROTATOR CUFF REPAIR WITH PLATELET RICH PLASMA    Assistant: Alvaro Johnson physician assistant    was responsible for performing the following activities: Retraction, Suction, Irrigation, Suturing, Closing and Placing Dressing and their skilled assistance was necessary for the success of this case.         Anesthesia: General with Block    IV fluids: See anesthesia record    Estimated Blood Loss: minimal    Implants:    Implant Name Type Inv. Item Serial No.  Lot No. LRB No. Used Action   SUT TPE HIFI NONABS JAMAL 2MM - [id number] Implant SUT TPE HIFI NONABS JAMAL 2MM  [employer] [id number] Right 1 Implanted   SUT LP NONABS CUFFLINK HIFI NMBR2 - [id number] Implant SUT LP NONABS CUFFLINK HIFI NMBR2  [employer] [id number] Right 1 Implanted   SUT/ANCH FRANCOISE KNOTLSS 5.5MM - [id number] Implant SUT/ANCH FRANCOISE KNOTLSS 5.5MM  [employer] [id number] Right 1 Implanted         Complications: None    Specimens:none    Description of Procedure: The patient's operative site was marked.  Regional anesthesia was administered.  They were brought to the  operating room and placed  on the operating room table.  General anesthesia was administered. Antibiotics were dosed.  A timeout was taken, confirming the correct operative site and procedure.  Exam under anesthesia indicated full range of motion and no instability.  They were placed in a semilateral position.  An axillary roll and SCDs were placed.  The right shoulder was prepped and draped in the standard surgical fashion.  The shoulder was injected with local anesthetic and was placed into traction.  A posterior portal was created.  A camera was inserted.  The glenoid chondral surface was intact.  The humerus surface was intact.  The subscapularis was intact.  The biceps was intact.  The labrum was intact.  The undersurface of the cuff demonstrated partial thinning at the supraspinatus footprint.  Diffuse capsulitis was present throughout the glenohumeral joint venkatesh performed a capsular release of the rotator interval and corresponding capsulitis as well as the anterior capsule.  A shaver was inserted.  The labrum probed and intact.  The biceps was pulled into the shoulder and found to be intact.  The axillary pouch was filled with  synovitis but no loose bodies.  Supraspinatus was tagged with PDS suture.  The instruments were placed in the subacromial space.  A full-thickness bursectomy was performed.  The CA ligament was intact.  No impingement was noted.  An accessory portal was created.  Abundant synovitis and adhesions was present throughout the subacromial space  as well.  Dorsal surface of the cuff demonstrated approximately 75% tearing of the supraspinatus with tear was completed.  Tear was visualized and prepared.  It was a 1 cm crescent shape tear.    Tuberosity skeletonized and a microfracture performed.   Fiber tape suture as well as a fiber link suture were used to create a mattress and ripstop configuration. These were secured to a anchor off the lateral edge of the tuberosity restoring the tendon  to its footprint.  PRP was injected after wound closure into the repair site    The instruments were removed.  The wounds were closed with suture and Steri-Strips.    30 mg of Toradol and lidocaine were injected into the deltoid  and a sterile dressing was applied to the rest of the shoulder.  They were placed in a sling and taken to the recovery room.  There were no complications.  I was present for all portions.  All counts were correct.  Good capillary refill was noted to the hand.      Justin Goode MD     Date: 3/31/2023  Time: 15:15 EDT

## 2023-03-31 NOTE — ANESTHESIA PROCEDURE NOTES
Peripheral Block    Pre-sedation assessment completed: 3/31/2023 1:00 PM    Patient reassessed immediately prior to procedure    Patient location during procedure: pre-op  Reason for block: procedure for pain, at surgeon's request, post-op pain management and secondary anesthetic  Performed by  Anesthesiologist: Grzegorz Navarro MD  Preanesthetic Checklist  Completed: patient identified, IV checked, site marked, risks and benefits discussed, surgical consent, monitors and equipment checked, pre-op evaluation and timeout performed  Prep:  Pt Position: sitting  Sterile barriers:cap, gloves, mask and washed/disinfected hands  Prep: ChloraPrep  Patient monitoring: blood pressure monitoring, continuous pulse oximetry and EKG  Procedure    Sedation: yes  Performed under: local infiltration  Guidance:ultrasound guided and landmark technique    ULTRASOUND INTERPRETATION.  Using ultrasound guidance a 22 G gauge needle was placed in close proximity to the brachial plexus nerve, at which point, under ultrasound guidance anesthetic was injected in the area of the nerve and spread of the anesthesia was seen on ultrasound in close proximity thereto.  There were no abnormalities seen on ultrasound; a digital image was taken; and the patient tolerated the procedure with no complications. Images:still images obtained, printed/placed on chart    Laterality:right  Block Type:interscalene  Injection Technique:single-shot  Needle Type:echogenic  Needle Gauge:22 G  Resistance on Injection: less than 15 psi    Medications Used: bupivacaine liposome (EXPAREL) injection 1.3% - Injection   10 mL - 3/31/2023 1:24:00 PM  bupivacaine PF (MARCAINE) injection 0.5% - Injection   10 mL - 3/31/2023 1:24:00 PM      Post Assessment  Injection Assessment: negative aspiration for heme, no paresthesia on injection and incremental injection  Patient Tolerance:comfortable throughout block  Complications:no  Additional Notes  Pre-procedure:  Peripheral  nerve block performed preoperatively prior to the start of anesthesia time at the request of the patient and the surgeon for the management of postoperative acute surgical pain as well as for a secondary intraoperative anesthetic (general anesthesia is the primary intraoperative anesthetic); patient identified; pre-procedure vital signs, all relevant labs/studies, complete medical/surgical/anesthetic history, full medication list, full allergy list, and NPO status obtained/reviewed; physical assessment performed; anesthetic options, side effects, potential complications, risks, and benefits discussed; questions answered; patient wishes to proceed with the procedure; written anesthesia procedure consent obtained; patient cleared for procedure; time out performed; IV access in situ    Procedure:  ASA monitor placed; supplemental oxygen provided; patient positioned; hand hygiene performed; sterile technique maintained throughout the procedure; sterile prep applied; insertion site determined by anatomical landmarks, palpation, and ultrasound imaging; live ultrasound guidance throughout the procedure; target nerves/landmarks identified on live ultrasound; skin and subcutaneous tissues numbed by injection of 1% lidocaine; 2 inch 22G Soneter Ultra 360 Insulated Echogenic Needle used; realtime needle advancement and placement near the target nerves witnessed on live ultrasound; negative aspiration prior to injection; correct needle placement confirmed on live ultrasound by local anesthetic spread around the target nerves; local anesthetic mixture injected with negative aspiration prior to each injection and after each 1-5 mL injected; needle withdrawn; dressing applied; ultrasound image printed and placed in the patient's permanent medical record    Post-procedure:  Peripheral nerve block placed successfully; good block; no apparent complications; minimal estimated blood loss; vital signs stable throughout; see nurse's  notes for vitals; transported to the OR, general anesthesia induced, and surgery started

## 2023-03-31 NOTE — ANESTHESIA POSTPROCEDURE EVALUATION
Patient: Amanda Guzman    Procedure Summary     Date: 03/31/23 Room / Location: [facility] OR 12 / [facility] MAIN OR    Anesthesia Start: 1401 Anesthesia Stop: 1538    Procedure: SHOULDER ARTHROSCOPY WITH ROTATOR CUFF REPAIR WITH PLATELET RICH PLASMA (Right: Shoulder) Diagnosis:       Complete tear of right rotator cuff, unspecified whether traumatic      (Complete tear of right rotator cuff, unspecified whether traumatic [M75.121])    Surgeons: Justin Goode MD Provider: Grzegorz Navarro MD    Anesthesia Type: general with block ASA Status: 3          Anesthesia Type: general with block    Vitals  Vitals Value Taken Time   /76 03/31/23 1544   Temp 97.7 °F (36.5 °C) 03/31/23 1537   Pulse 105 03/31/23 1546   Resp 27 03/31/23 1542   SpO2 97 % 03/31/23 1546   Vitals shown include unvalidated device data.        Post Anesthesia Care and Evaluation    Patient location during evaluation: PACU  Patient participation: complete - patient participated  Level of consciousness: awake  Pain scale: See nurse's notes for pain score.  Pain management: adequate    Airway patency: patent  Anesthetic complications: No anesthetic complications  PONV Status: none  Cardiovascular status: acceptable  Respiratory status: acceptable and spontaneous ventilation  Hydration status: acceptable    Comments: Patient seen and examined postoperatively; vital signs stable; SpO2 greater than or equal to 90%; cardiopulmonary status stable; nausea/vomiting adequately controlled; pain adequately controlled; no apparent anesthesia complications; patient discharged from anesthesia care when discharge criteria were met

## 2023-04-03 ENCOUNTER — OFFICE VISIT (OUTPATIENT)
Dept: ORTHOPEDIC SURGERY | Facility: CLINIC | Age: 53
End: 2023-04-03
Payer: OTHER MISCELLANEOUS

## 2023-04-03 VITALS — BODY MASS INDEX: 37.51 KG/M2 | HEIGHT: 67 IN | WEIGHT: 239 LBS | HEART RATE: 93 BPM

## 2023-04-03 DIAGNOSIS — Z47.89 ORTHOPEDIC AFTERCARE: Primary | ICD-10-CM

## 2023-04-03 PROCEDURE — 99024 POSTOP FOLLOW-UP VISIT: CPT | Performed by: ORTHOPAEDIC SURGERY

## 2023-04-03 NOTE — PROGRESS NOTES
"     Patient ID: Amanda Guzman is a 52 y.o. female.    3/31/23 right shoulder arthroscopy with capsular release and supraspinatus repair  Pain controlled  Objective:    Pulse 93   Ht 170.2 cm (67\")   Wt 108 kg (239 lb)   BMI 37.43 kg/m²     Physical Examination:    Wounds are well approximated without infection.  Sensory and motor exam are intact in all distributions. Radial pulse is palpable and capillary refill is less than two seconds to all digits.    Imaging:      Assessment:  Doing well after cuff repair    Plan:  Wounds were cleaned and redressed. Restrictions discussed.  Begin therapy and see me in 4 weeks.  Remove dressings in two weeks.  "

## 2023-04-03 NOTE — PATIENT INSTRUCTIONS
Shoulder Arthroscopy: Post- Operative Visit Objectives    Review the operative findings, procedures and photos.  Make sure medications are effective and not causing problems.  Pain: Oxycodone or hydrocodone is for pain. You may take 1 tablet every 6 hours as necessary.  Some patients don’t require the use of these…in those circumstances just use Tylenol extra-strength 1 or 2 tablets every 4-6 hours.   Naproxen 500 mg. For pain and inflammation.  You should take 1 every twice a day.  Do not use Aleve, Ibuprofen, Motrin or Advil during this time.  If you have had any problems stop taking these medicines and please tell us!  Keflex (cephalexin). This is an antibiotic to be taken as a preventive medicine.  Take 1 pill every 6 hours for 1 day. If you have a penicillin allergy this will be replaced by other options.  Wound Care:  Today we will change your dressings and cover your wounds with a plastic covering called Tegaderm. This will allow you to shower immediately.  Remove the tegaderm and underlying dressings in two weeks then ok to get wet in a shower. No Baths or swimming until 4 weeks after surgery.  Keep a towel on the dressing while applying ice.  Exercises and Physical Therapy Remember the protocol is 3 phases:  Healing (6 wks)  Continue the use of the sling for 6 weeks; depending on procedure utilized this may be shorter. You can do gentle range of motion exercise of the elbow and wrist immediately with the arm at the side.  Formal physical therapy will usually start in two weeks.    Rehabilitative (6 wks) You begin a more aggressive phase of physical therapy at the 6 week jina. Light strengthening and a continued emphasis on protecting the repair are important at this stage.  Restorative (4 wks)  Back to your sports and job activities gradually   Follow Up appointments Schedule Follow up visits as directed usually in 4 weeks. Physical therapy will be ordered today and you should receive a phone call or can  schedule yourself if appropriate.  Notes  Make sure you have all necessary notes and documentation for school or work.  Issues: Remember our goal is to make this process smooth and easy if there is any thing you need please ask us or call back [phone]

## 2023-04-06 ENCOUNTER — TREATMENT (OUTPATIENT)
Dept: PHYSICAL THERAPY | Facility: CLINIC | Age: 53
End: 2023-04-06
Payer: OTHER MISCELLANEOUS

## 2023-04-06 DIAGNOSIS — Z47.89 ORTHOPEDIC AFTERCARE: Primary | ICD-10-CM

## 2023-04-06 DIAGNOSIS — M25.511 ACUTE PAIN OF RIGHT SHOULDER: ICD-10-CM

## 2023-04-06 DIAGNOSIS — M75.121 COMPLETE TEAR OF RIGHT ROTATOR CUFF, UNSPECIFIED WHETHER TRAUMATIC: ICD-10-CM

## 2023-04-06 PROCEDURE — 97140 MANUAL THERAPY 1/> REGIONS: CPT | Performed by: PHYSICAL THERAPIST

## 2023-04-06 PROCEDURE — 97110 THERAPEUTIC EXERCISES: CPT | Performed by: PHYSICAL THERAPIST

## 2023-04-06 PROCEDURE — 97014 ELECTRIC STIMULATION THERAPY: CPT | Performed by: PHYSICAL THERAPIST

## 2023-04-06 PROCEDURE — 97162 PT EVAL MOD COMPLEX 30 MIN: CPT | Performed by: PHYSICAL THERAPIST

## 2023-04-06 NOTE — PROGRESS NOTES
Physical Therapy Initial Evaluation and Plan of Care  Office: [address] [facility] [address], [place] [address]  P: [phone]  F: [phone]    Patient: Amanda Guzman   : 1970  Diagnosis/ICD-10 Code:  Orthopedic aftercare [Z47.89]  Referring practitioner: Justin Goode, *  Date of Initial Visit: 2023  Today's Date: 2023  Patient seen for 1 sessions           Subjective Questionnaire: QuickDASH: 80% impairment; QuickDASH work subscale: 100% impairment       Subjective Evaluation    History of Present Illness  Mechanism of injury: Pt reports that she had R RTC repair on 3/31/2023. Pt reports that she injured shoulder at work. Was pushing med cart and there was a cord across the floor. Cart caught and she stumbled and then reached out to catch herself with the R arm on . Hurt shoulder and pain just worsened by the hour. X-rays were negative and had MRI which showed the tear supraspinatus and infraspinatus per pt report. Pt states that pain is better than it was prior to surgery. She was having sharp pains before and that is gone. No numbness or tingling. She is having trouble sleeping because of positioning. Can get 2-3 hours at a time. Saw surgeon Monday and they said everything looked good. Return on 2023 for next follow-up. Pt is LPN. Wearing sling all the time.     Pain  Current pain ratin  At best pain ratin  At worst pain rating: 10  Quality: pressure and squeezing  Relieving factors: ice and medications (Oxycodone every 6 hours, has been able to go up to 8 hours)  Aggravating factors: movement and sleeping    Social Support  Lives with: spouse    Hand dominance: right    Diagnostic Tests  X-ray: normal  MRI studies: abnormal    Patient Goals  Patient goals for therapy: decreased pain, increased strength, independence with ADLs/IADLs, return to work, return to sport/leisure activities, increased motion and decreased edema  Patient goal: Gardening, fishing, kayaking,  walking          Past Medical History:   Diagnosis Date   • Asthma    • Depression    • Diabetes mellitus    • Obese         Past Surgical History:   Procedure Laterality Date   • CHOLECYSTECTOMY     • NASAL SINUS SURGERY Bilateral 2017   • SHOULDER ARTHROSCOPY W/ ROTATOR CUFF REPAIR Right 3/31/2023    Procedure: SHOULDER ARTHROSCOPY WITH ROTATOR CUFF REPAIR WITH PLATELET RICH PLASMA;  Surgeon: Justin Goode MD;  Location: [facility] MAIN OR;  Service: Orthopedics;  Laterality: Right;        Objective          Postural Observations  Seated posture: fair  Standing posture: fair        Palpation     Right Tenderness of the levator scapulae, middle trapezius and upper trapezius.     Tenderness     Additional Tenderness Details  Gross tenderness around the R shoulder     Neurological Testing     Sensation     Shoulder   Left Shoulder   Intact: light touch    Right Shoulder   Intact: light touch    Active Range of Motion   Left Shoulder   Normal active range of motion    Passive Range of Motion     Right Shoulder   Flexion: 20 degrees with pain    Additional Passive Range of Motion Details  Significant pain with PROM assessment this date. Will cont to assess each visit.           Assessment & Plan     Assessment  Impairments: abnormal coordination, abnormal muscle firing, abnormal muscle tone, abnormal or restricted ROM, activity intolerance, impaired physical strength, lacks appropriate home exercise program, pain with function and weight-bearing intolerance  Functional Limitations: carrying objects, lifting, sleeping, pulling, pushing, uncomfortable because of pain, reaching behind back and reaching overhead  Assessment details: Pt is a 52 year old s/p RTC repair on 3/31/2023. Pt demonstrates limited PROM of the shoulder due to pain. Also difficulty with scap movement due to pain. Pt has limited elbow ROM due to pain as well. Can perform AAROM of the elbow with mod discomfort. Wrist AROM is WNL. Pt is unable to  perform most ADL's and tasks at home due to post-op status and pain. Functional limitations are listed above. Pt will benefit from PT in order to address impairments and improve overall function.     Prognosis: good    Goals  Plan Goals: STG (3 weeks):  Pt will demonstrate increased activation of scap stabilizers during activities/exercises to decrease load on shoulder.   Pt will display improved ROM of shoulder to WFL with min to no pain to assist with functional tasks.     LTG (6 weeks):  Pt will be independent with home exercises to assist with improved strength and continue to improve function.   Pt will demonstrate decreased score on the QuickDASH to 30% and work subscale to 40% to demonstrate decreased overall impairment.   Pt will be able to perform housework and yard work with min to no increased tightness or pain in the shoulder for improved function.   Pt will demonstrate improved shoulder and scapular strength to 4+/5 overall to assist with function.        Plan  Therapy options: will be seen for skilled therapy services  Planned modality interventions: cryotherapy, TENS and thermotherapy (hydrocollator packs)  Planned therapy interventions: ADL retraining, body mechanics training, fine motor coordination training, flexibility, functional ROM exercises, home exercise program, joint mobilization, manual therapy, motor coordination training, neuromuscular re-education, postural training, soft tissue mobilization, spinal/joint mobilization, strengthening, stretching and therapeutic activities  Frequency: 2x week  Duration in weeks: 12  Treatment plan discussed with: patient        HEP:   Access Code: [email or web address]  URL: [email or web address]  Date: 04/10/2023  Prepared by: Pili Pemberton    Exercises  - Wrist Flexion Extension AROM - Palms Down  - 2 x daily - 10 reps  -  with stress ball  - 2 x daily - 10 reps  - Seated Elbow Flexion and Extension AROM  - 2 x daily - 10 reps  - Standing Upper  Cervical Flexion and Extension  - 2 x daily - 5 reps      History # of Personal Factors and/or Comorbidities: MODERATE (1-2)  Examination of Body System(s): # of elements: MODERATE (3)  Clinical Presentation: EVOLVING  Clinical Decision Making: MODERATE      Eval:  Low Eval          Mins  23374  Mod Eval     25     Mins  53328  High Eval                            Mins  53096    Timed:         Manual Therapy:    10     mins  65809;     Therapeutic Exercise:    10     mins  57950;     Neuromuscular Agustin:        mins  76683;    Therapeutic Activity:          mins  22354;     Gait Training:           mins  37933;       Un-Timed:  Electrical Stimulation:    15     mins  24003 ( );  Traction          mins 06575      Timed Treatment:   20   mins   Total Treatment:     60   mins    PT SIGNATURE: Pili Pemberton, PT, DPT, OCS           [place] License # [id number]           [place] License # [id number]    DATE TREATMENT INITIATED: 4/6/2023    Initial Certification  Certification Period: 7/4/2023  I certify that the therapy services are furnished while this patient is under my care.  The services outlined above are required by this patient, and will be reviewed every 90 days.     PHYSICIAN: Justin Goode MD      DATE:     Please sign and return via fax to [phone].. Thank you, [facility].

## 2023-04-10 ENCOUNTER — TREATMENT (OUTPATIENT)
Dept: PHYSICAL THERAPY | Facility: CLINIC | Age: 53
End: 2023-04-10
Payer: OTHER MISCELLANEOUS

## 2023-04-10 DIAGNOSIS — M25.511 ACUTE PAIN OF RIGHT SHOULDER: ICD-10-CM

## 2023-04-10 DIAGNOSIS — Z47.89 ORTHOPEDIC AFTERCARE: Primary | ICD-10-CM

## 2023-04-10 DIAGNOSIS — M75.121 COMPLETE TEAR OF RIGHT ROTATOR CUFF, UNSPECIFIED WHETHER TRAUMATIC: ICD-10-CM

## 2023-04-10 PROCEDURE — 97140 MANUAL THERAPY 1/> REGIONS: CPT | Performed by: PHYSICAL THERAPIST

## 2023-04-10 PROCEDURE — 97110 THERAPEUTIC EXERCISES: CPT | Performed by: PHYSICAL THERAPIST

## 2023-04-10 PROCEDURE — 97014 ELECTRIC STIMULATION THERAPY: CPT | Performed by: PHYSICAL THERAPIST

## 2023-04-10 NOTE — PROGRESS NOTES
Physical Therapy Daily Note  Office: [address] [facility] [address], [place] [address]  P: [phone]  F: [phone]    Patient: Amanda Guzman   : 1970  Diagnosis/ICD-10 Code:  Orthopedic aftercare [Z47.89]  Referring practitioner: Justin Goode, *  Date of Initial Visit: 4/10/2023  Today's Date: 4/10/2023  Patient seen for 2 sessions                                                                                                                                                                                                                                                                                                                               VISIT#:  sessions authorized per POC (Recert due 2023)     Precautions/Restrictions: R RTC repair on 3/31/2023    Subjective  Amanda Guzman reports that she is still having pain in the shoulder. Was really hurting after last session. Has been using ice on the shoulder. Having pain in the forearm and wrist too.       Objective  ~70 degrees passive flex R shoulder   Tenderness throughout entire arm     See Exercise, Manual, and Modality Logs for complete treatment.       Assessment/Plan   Pt demonstrates good progress with PROM this date compared to last visit. Still discomfort but able to achieve ~70 degrees. Pt had pain due to tightness with scap squeezes and shoulder shrugs, however improved as she continued. Modalities at end of session for pain.       Progress per Plan of Care and Progress strengthening /stabilization /functional activity            Timed:         Manual Therapy:    23     mins  27328;     Therapeutic Exercise:    8     mins  08276;     Neuromuscular Agustin:        mins  95827;    Therapeutic Activity:          mins  62359;     Gait Training:           mins  65063;     Ultrasound:          mins  11706;    Ionto                                   mins   58977  Self Care                            mins   19868    Un-Timed:  Electrical  Stimulation:    15     mins  69603 ( );  Traction          mins 72740    Timed Treatment:   31   mins   Total Treatment:     46   mins    Pili Pemberton, PT, DPT, OCS     [place] License # [id number]     [place] License # [id number]

## 2023-04-12 ENCOUNTER — TREATMENT (OUTPATIENT)
Dept: PHYSICAL THERAPY | Facility: CLINIC | Age: 53
End: 2023-04-12
Payer: OTHER MISCELLANEOUS

## 2023-04-12 DIAGNOSIS — M25.511 ACUTE PAIN OF RIGHT SHOULDER: ICD-10-CM

## 2023-04-12 DIAGNOSIS — M75.121 COMPLETE TEAR OF RIGHT ROTATOR CUFF, UNSPECIFIED WHETHER TRAUMATIC: ICD-10-CM

## 2023-04-12 DIAGNOSIS — Z47.89 ORTHOPEDIC AFTERCARE: Primary | ICD-10-CM

## 2023-04-12 NOTE — PROGRESS NOTES
Physical Therapy Daily Note  Office: [address] [facility] [address], [place] [address]  P: [phone]  F: [phone]    Patient: Amanda Guzman   : 1970  Diagnosis/ICD-10 Code:  Orthopedic aftercare [Z47.89]  Referring practitioner: Justin Goode, *  Date of Initial Visit: 2023  Today's Date: 2023  Patient seen for 3 sessions                                                                                                                                                                                                                                                                                                                               VISIT#: 3/24 sessions authorized per POC (Recert due 2023)     Precautions/Restrictions: R RTC repair on 3/31/2023    Subjective  Amanda Guzman reports that she felt better after last session, not as much pain. However, she just woke up today and shoulder feels very stiff.       Objective  ~70 degrees passive flex R shoulder (last visit)   Tenderness throughout entire arm     See Exercise, Manual, and Modality Logs for complete treatment.       Assessment/Plan   Pt demonstrates more pain and discomfort this date. Unable to relax fully for PROM, therefore unable to get past ~30 degrees flex due to pain. Mostly focused on soft tissue massage this date. Attempted exercises and pt was able to perform, however in small movements due to pain/stiffness. Pt tolerated modalities well at end of session for pain control.       Progress per Plan of Care and Progress strengthening /stabilization /functional activity            Timed:         Manual Therapy:    10     mins  15731;     Therapeutic Exercise:    8     mins  38552;     Neuromuscular Agustin:        mins  38590;    Therapeutic Activity:    8      mins  91428;     Gait Training:           mins  79824;     Ultrasound:          mins  73659;    Ionto                                   mins   29058  Self Care                             mins   09781    Un-Timed:  Electrical Stimulation:    15     mins  80908 ( );  Traction          mins 53960    Timed Treatment:   26   mins   Total Treatment:     41   mins    Pili Pemberton PT, DPT, OCS     [place] License # [id number]

## 2023-04-17 ENCOUNTER — TREATMENT (OUTPATIENT)
Dept: PHYSICAL THERAPY | Facility: CLINIC | Age: 53
End: 2023-04-17
Payer: OTHER MISCELLANEOUS

## 2023-04-17 NOTE — PROGRESS NOTES
Physical Therapy Daily Note  Office: [address] [facility] [address], [place] [address]  P: [phone]  F: [phone]    Patient: Amanda Guzman   : 1970  Diagnosis/ICD-10 Code:  No primary diagnosis found.  Referring practitioner: Justin Goode, *  Date of Initial Visit: 2023  Today's Date: 2023  Patient seen for 4 sessions                                                                                                                                                                                                                                                                                                                               VISIT#:  sessions authorized per POC (Recert due 2023)     Precautions/Restrictions: R RTC repair on 3/31/2023    Subjective  Amanda Guzman reports that shoulder is feeling a little better. She has been having trouble with sleeping still. Can't find a comfortable position. Tries recliner and on wedge in bed but nothing seems to last more than a short period of time.      Objective  ~70 degrees passive flex R shoulder   Tenderness in shoulder and upper arm, less in forearm     See Exercise, Manual, and Modality Logs for complete treatment.       Assessment/Plan   Pt demonstrates less pain with manual treatment this date. However, continues to have a difficult time relaxing. Pt given counter walkout stretch this date for her to perform PROM of the shoulder at home.       Progress per Plan of Care and Progress strengthening /stabilization /functional activity            Timed:         Manual Therapy:    10     mins  51160;     Therapeutic Exercise:    15     mins  56126;     Neuromuscular Agustin:        mins  61808;    Therapeutic Activity:    8      mins  77036;     Gait Training:           mins  76899;     Ultrasound:          mins  75581;    Ionto                                   mins   88290  Self Care                            mins    34005    Un-Timed:  Electrical Stimulation:    15     mins  64570 ( );  Traction          mins 63097    Timed Treatment:   33   mins   Total Treatment:     48   mins    Pili Pemberton PT, DPT, OCS     [place] License # [id number]

## 2023-04-19 ENCOUNTER — TREATMENT (OUTPATIENT)
Dept: PHYSICAL THERAPY | Facility: CLINIC | Age: 53
End: 2023-04-19

## 2023-04-19 DIAGNOSIS — M75.121 COMPLETE TEAR OF RIGHT ROTATOR CUFF, UNSPECIFIED WHETHER TRAUMATIC: ICD-10-CM

## 2023-04-19 DIAGNOSIS — M25.511 ACUTE PAIN OF RIGHT SHOULDER: ICD-10-CM

## 2023-04-19 DIAGNOSIS — Z47.89 ORTHOPEDIC AFTERCARE: Primary | ICD-10-CM

## 2023-04-19 NOTE — PROGRESS NOTES
Physical Therapy Daily Note  Office: [address] [facility] [address], [place] [address]  P: [phone]  F: [phone]    Patient: Amanda Guzman   : 1970  Diagnosis/ICD-10 Code:  Orthopedic aftercare [Z47.89]  Referring practitioner: Justin Goode, *  Date of Initial Visit: 2023  Today's Date: 2023  Patient seen for 5 sessions                                                                                                                                                                                                                                                                                                                               VISIT#:  sessions authorized per POC (Recert due 2023)     Precautions/Restrictions: R RTC repair on 3/31/2023    Subjective  Amanda Guzman reports that shoulder is doing better today. Seems to be improving little by little. She is still having trouble sleeping but does feel like it has improved a little.     Objective  ~70 degrees passive flex R shoulder   Tenderness in shoulder and upper arm, less in forearm     See Exercise, Manual, and Modality Logs for complete treatment.       Assessment/Plan   Pt demonstrates less pain with passive flexion, however still guards ~70-80 degrees due to pain. Able to achieve more with passive flexion at counter on her own. Attempted more passive ER, however pt also has increased pain and guards against the motion. Modalities at end of session for pain control.       Progress per Plan of Care and Progress strengthening /stabilization /functional activity            Timed:         Manual Therapy:    8     mins  79939;     Therapeutic Exercise:    10     mins  38535;     Neuromuscular Agustin:        mins  63051;    Therapeutic Activity:    8      mins  06555;     Gait Training:           mins  28422;     Ultrasound:          mins  67562;    Ionto                                   mins   32574  Self Care                             mins   26689    Un-Timed:  Electrical Stimulation:    15     mins  25016 ( );  Traction          mins 13383    Timed Treatment:   26   mins   Total Treatment:     41   mins    Pili Pemberton PT, DPT, OCS     [place] License # [id number]

## 2023-04-24 ENCOUNTER — TREATMENT (OUTPATIENT)
Dept: PHYSICAL THERAPY | Facility: CLINIC | Age: 53
End: 2023-04-24
Payer: OTHER MISCELLANEOUS

## 2023-04-24 DIAGNOSIS — M75.121 COMPLETE TEAR OF RIGHT ROTATOR CUFF, UNSPECIFIED WHETHER TRAUMATIC: ICD-10-CM

## 2023-04-24 DIAGNOSIS — Z47.89 ORTHOPEDIC AFTERCARE: Primary | ICD-10-CM

## 2023-04-24 DIAGNOSIS — M25.511 ACUTE PAIN OF RIGHT SHOULDER: ICD-10-CM

## 2023-04-25 NOTE — PROGRESS NOTES
Physical Therapy Daily Note  Office: [address] [facility] [address], [place] [address]  P: [phone]  F: [phone]    Patient: Amanda Guzman   : 1970  Diagnosis/ICD-10 Code:  Orthopedic aftercare [Z47.89]  Referring practitioner: Justin Goode, *  Date of Initial Visit: 2023  Today's Date: 2023  Patient seen for 6 sessions                                                                                                                                                                                                                                                                                                                               VISIT#:  sessions authorized per POC (Recert due 2023)     Precautions/Restrictions: R RTC repair on 3/31/2023    Subjective  Amanda Guzman reports that her shoulder has been hurting more the last two days. Not sure why. She did start the US at home that MD's office sent her and wondering if that's what's bothering her. Pain is in the biceps and the mid upper arm, not in the shoulder. Thinks she could have overdone it with her exercises too.     Objective  ~80 degrees passive flex R shoulder   Tenderness in mid-upper arm, less in forearm     See Exercise, Manual, and Modality Logs for complete treatment.       Assessment/Plan   Pt demonstrates more tenderness to palpation in the mid upper arm today, laura the biceps. Improved with some STM, however unable to perform much PROM this date due to pt guarding significantly. Attempted different techniques to allow pt to relax the arm, however she continued to guard even prior to PROM starting. Pt was able to perform the passive flex stretch at counter without much increased pain. Instructed pt to hold elbow flex at home so that she could give her biceps a break in case she overworked it at home.     Progress per Plan of Care and Progress strengthening /stabilization /functional activity            Timed:          Manual Therapy:    8     mins  72626;     Therapeutic Exercise:    8     mins  41328;     Neuromuscular Agustin:   8     mins  85778;    Therapeutic Activity:          mins  10521;     Gait Training:           mins  43376;     Ultrasound:          mins  26299;    Ionto                                   mins   17427  Self Care                            mins   19272    Un-Timed:  Electrical Stimulation:    15     mins  22952 ( );  Traction          mins 22918    Timed Treatment:   24   mins   Total Treatment:     39   mins    Pili Pemberton, PT, DPT, OCS     [place] License # [id number]

## 2023-04-26 ENCOUNTER — TREATMENT (OUTPATIENT)
Dept: PHYSICAL THERAPY | Facility: CLINIC | Age: 53
End: 2023-04-26
Payer: COMMERCIAL

## 2023-04-26 DIAGNOSIS — M25.511 ACUTE PAIN OF RIGHT SHOULDER: ICD-10-CM

## 2023-04-26 DIAGNOSIS — Z47.89 ORTHOPEDIC AFTERCARE: ICD-10-CM

## 2023-04-26 DIAGNOSIS — M75.121 COMPLETE TEAR OF RIGHT ROTATOR CUFF, UNSPECIFIED WHETHER TRAUMATIC: Primary | ICD-10-CM

## 2023-04-26 NOTE — PROGRESS NOTES
Physical Therapy Daily Note  Office: [address] [facility] [address], [place] [address]  P: [phone]  F: [phone]    Patient: Amanda Guzman   : 1970  Diagnosis/ICD-10 Code:  Complete tear of right rotator cuff, unspecified whether traumatic [M75.121]  Referring practitioner: Justin Goode, *  Date of Initial Visit: 2023  Today's Date: 2023  Patient seen for 7 sessions                                                                                                                                                                                                                                                                                                                               VISIT#:  sessions authorized per POC (Recert due 2023)     Precautions/Restrictions: R RTC repair on 3/31/2023    Subjective  Amanda Guzman reports that she is still having more pain in her biceps. It does feel better than it did but still hurts. Shoulder feels fine, just stiff.     Objective  90 degrees passive flex R shoulder; 12 degrees passive ER   Tenderness in mid-upper arm, less in forearm     See Exercise, Manual, and Modality Logs for complete treatment.       Assessment/Plan   Pt continues to have pain in the anterior upper arm around biceps muscle belly. Instructed pt to hold all elbow flexion and use the other arm to assist when she needs to move it. Pt demonstrates improved scap stabilization with exercises. Pt continues to have significant guarding when PROM is attempted, so unable to achieve much. Pt is able to achieve 100 degrees flex when doing passive stretch at counter on her own.     Progress per Plan of Care and Progress strengthening /stabilization /functional activity            Timed:         Manual Therapy:    8     mins  15431;     Therapeutic Exercise:         mins  39598;     Neuromuscular Agustin:   8     mins  30172;    Therapeutic Activity:     8     mins  98943;     Gait Training:            mins  28738;     Ultrasound:          mins  13315;    Ionto                                   mins   23287  Self Care                            mins   46482    Un-Timed:  Electrical Stimulation:    15     mins  33827 ( );  Traction          mins 94741    Timed Treatment:   24   mins   Total Treatment:     39   mins    Pili Pemberton PT, DPT, OCS     [place] License # [id number]

## 2023-05-01 ENCOUNTER — OFFICE VISIT (OUTPATIENT)
Dept: ORTHOPEDIC SURGERY | Facility: CLINIC | Age: 53
End: 2023-05-01
Payer: OTHER MISCELLANEOUS

## 2023-05-01 VITALS — HEIGHT: 67 IN | WEIGHT: 239 LBS | BODY MASS INDEX: 37.51 KG/M2 | HEART RATE: 81 BPM

## 2023-05-01 DIAGNOSIS — Z47.89 ORTHOPEDIC AFTERCARE: Primary | ICD-10-CM

## 2023-05-01 PROCEDURE — 99024 POSTOP FOLLOW-UP VISIT: CPT | Performed by: ORTHOPAEDIC SURGERY

## 2023-05-01 NOTE — PROGRESS NOTES
Patient ID: Amanda Guzman is a 52 y.o. female.    3/31/23 right shoulder arthroscopy with capsular release and supraspinatus repair  Pain controlled    Objective:    There were no vitals taken for this visit.    Physical Examination:  Incisions are healed passive elevation 90 external rotation 10       Imaging:      Assessment:  Doing well after cuff repair and capsular release    Plan:  Sling for 2 weeks off of work continue therapy see me in a month

## 2023-05-03 ENCOUNTER — TREATMENT (OUTPATIENT)
Dept: PHYSICAL THERAPY | Facility: CLINIC | Age: 53
End: 2023-05-03
Payer: COMMERCIAL

## 2023-05-03 DIAGNOSIS — Z47.89 ORTHOPEDIC AFTERCARE: ICD-10-CM

## 2023-05-03 DIAGNOSIS — M25.511 ACUTE PAIN OF RIGHT SHOULDER: ICD-10-CM

## 2023-05-03 DIAGNOSIS — M75.121 COMPLETE TEAR OF RIGHT ROTATOR CUFF, UNSPECIFIED WHETHER TRAUMATIC: Primary | ICD-10-CM

## 2023-05-04 NOTE — PROGRESS NOTES
Physical Therapy Daily Note  Office: [address] [facility] [address], [place] [address]  P: [phone]  F: [phone]    Patient: Amanda Guzman   : 1970  Diagnosis/ICD-10 Code:  Complete tear of right rotator cuff, unspecified whether traumatic [M75.121]  Referring practitioner: Justin Goode, *  Date of Initial Visit: 5/3/2023  Today's Date: 2023  Patient seen for 8 sessions                                                                                                                                                                                                                                                                                                                               VISIT#:  sessions authorized per POC (Recert due 2023)     Precautions/Restrictions: R RTC repair on 3/31/2023    Subjective  Amanda Guzman reports that her pain is better than it was. She hasn't been using the elbow as much and biceps is doing better. She did see Surgeon and he told her to continue with PT. Wear the sling for a few more weeks and then she can d/c. She still hasn't used the ultrasound. Is worried it will cause her more pain.     Objective  90 degrees passive flex R shoulder; 12 degrees passive ER   Tenderness in mid-upper arm, less in forearm     See Exercise, Manual, and Modality Logs for complete treatment.       Assessment/Plan   Pt was able to relax more during session today and not guard against PROM as much, however still unable to move past 90 degrees. Pt is also unable to lie supine still due to increased pulling on the R shoulder. Attempted some AAROM this date, however pt had pain which limited motion to very minimal. Pt instructed to continue with exercises at home but to not push into pain.     Progress per Plan of Care and Progress strengthening /stabilization /functional activity            Timed:         Manual Therapy:    8     mins  89583;     Therapeutic Exercise:    8     mins   97981;     Neuromuscular Agustin:        mins  58895;    Therapeutic Activity:     8     mins  92749;     Gait Training:           mins  80592;     Ultrasound:          mins  33816;    Ionto                                   mins   44937  Self Care                            mins   53879    Un-Timed:  Electrical Stimulation:    15     mins  46002 ( );  Traction          mins 40449    Timed Treatment:   24   mins   Total Treatment:     39   mins    Pili Pemberton PT, DPT, OCS     [place] License # [id number]

## 2023-05-05 ENCOUNTER — TREATMENT (OUTPATIENT)
Dept: PHYSICAL THERAPY | Facility: CLINIC | Age: 53
End: 2023-05-05
Payer: OTHER MISCELLANEOUS

## 2023-05-05 DIAGNOSIS — M25.511 ACUTE PAIN OF RIGHT SHOULDER: ICD-10-CM

## 2023-05-05 DIAGNOSIS — M75.121 COMPLETE TEAR OF RIGHT ROTATOR CUFF, UNSPECIFIED WHETHER TRAUMATIC: Primary | ICD-10-CM

## 2023-05-05 DIAGNOSIS — Z47.89 ORTHOPEDIC AFTERCARE: ICD-10-CM

## 2023-05-05 NOTE — PROGRESS NOTES
Physical Therapy Daily Note  Office: [address] [facility] [address], [place] [address]  P: [phone]  F: [phone]    Patient: Amanda Guzman   : 1970  Diagnosis/ICD-10 Code:  Complete tear of right rotator cuff, unspecified whether traumatic [M75.121]  Referring practitioner: Justin Goode, *  Date of Initial Visit: 2023  Today's Date: 2023  Patient seen for 9 sessions                                                                                                                                                                                                                                                                                                                               VISIT#:  sessions authorized per POC (Recert due 2023)     Precautions/Restrictions: R RTC repair on 3/31/2023    Subjective  Amanda Guzman reports that she was doing better but shoulder and arm are hurting more today. Thinks she slept on it some on accident last night. Woke up with the shoulder and the forearm hurting.     Objective  90 degrees passive flex R shoulder; 12 degrees passive ER   Tenderness in mid-upper arm, mod in forearm     See Exercise, Manual, and Modality Logs for complete treatment.       Assessment/Plan   Less pain and tenderness in the shoulder and the biceps today, however pt had more in the forearm. Improved with manual treatment. Able to achieve more PROM this date, however still difficult due to pt guarding. Progressed AAROM some, however pt has much difficulty with it. Unable to lie flat due to pulling on the shoulder still so attempting AA in small ranges in sitting.     Progress per Plan of Care and Progress strengthening /stabilization /functional activity            Timed:         Manual Therapy:    8     mins  35361;     Therapeutic Exercise:    8     mins  07638;     Neuromuscular Agustin:        mins  77364;    Therapeutic Activity:     8     mins  51170;     Gait Training:            mins  32473;     Ultrasound:          mins  85288;    Ionto                                   mins   94773  Self Care                            mins   49813    Un-Timed:  Electrical Stimulation:    15     mins  91702 ( );  Traction          mins 28789    Timed Treatment:   24   mins   Total Treatment:     39   mins    Pili Pemberton PT, DPT, OCS     [place] License # [id number]

## 2023-05-08 ENCOUNTER — TREATMENT (OUTPATIENT)
Dept: PHYSICAL THERAPY | Facility: CLINIC | Age: 53
End: 2023-05-08
Payer: OTHER MISCELLANEOUS

## 2023-05-08 DIAGNOSIS — M75.121 COMPLETE TEAR OF RIGHT ROTATOR CUFF, UNSPECIFIED WHETHER TRAUMATIC: Primary | ICD-10-CM

## 2023-05-08 DIAGNOSIS — M25.511 ACUTE PAIN OF RIGHT SHOULDER: ICD-10-CM

## 2023-05-08 DIAGNOSIS — Z47.89 ORTHOPEDIC AFTERCARE: ICD-10-CM

## 2023-05-08 NOTE — PROGRESS NOTES
Physical Therapy Daily Note  Office: [address] [facility] [address], [place] [address]  P: [phone]  F: [phone]    Patient: Amanda Guzman   : 1970  Diagnosis/ICD-10 Code:  Complete tear of right rotator cuff, unspecified whether traumatic [M75.121]  Referring practitioner: Justin Goode, *  Date of Initial Visit: 2023  Today's Date: 2023  Patient seen for 10 sessions                                                                                                                                                                                                                                                                                                                               VISIT#: 10/24 sessions authorized per POC (Recert due 2023)     Precautions/Restrictions: R RTC repair on 3/31/2023    Subjective  Amanda Guzman reports that she is doing better. Shoulder is still painful but not hurting as much. She did try to lie flat, however still pulled on the shoulder and also pushed on her shoulder blade. Was able to lie back a little bit more in the recliner though.     Objective  98 degrees passive flex R shoulder  Min tenderness in the shoulder and upper arm    See Exercise, Manual, and Modality Logs for complete treatment.       Assessment/Plan       Progress per Plan of Care and Progress strengthening /stabilization /functional activity            Timed:         Manual Therapy:    8     mins  14949;     Therapeutic Exercise:    8     mins  23774;     Neuromuscular Agsutin:        mins  76350;    Therapeutic Activity:     8     mins  56762;     Gait Training:           mins  33794;     Ultrasound:          mins  03791;    Ionto                                   mins   50898  Self Care                            mins   30687    Un-Timed:  Electrical Stimulation:    15     mins  65890 ( );  Traction          mins 70392    Timed Treatment:   24   mins   Total Treatment:     39    mins    Pili Pemberton, PT, DPT, OCS     [place] License # [id number]

## 2023-05-10 ENCOUNTER — TREATMENT (OUTPATIENT)
Dept: PHYSICAL THERAPY | Facility: CLINIC | Age: 53
End: 2023-05-10
Payer: OTHER MISCELLANEOUS

## 2023-05-10 DIAGNOSIS — M25.511 ACUTE PAIN OF RIGHT SHOULDER: ICD-10-CM

## 2023-05-10 DIAGNOSIS — M75.121 COMPLETE TEAR OF RIGHT ROTATOR CUFF, UNSPECIFIED WHETHER TRAUMATIC: Primary | ICD-10-CM

## 2023-05-10 DIAGNOSIS — Z47.89 ORTHOPEDIC AFTERCARE: ICD-10-CM

## 2023-05-10 NOTE — PROGRESS NOTES
Physical Therapy Daily Note  Office: [address] [facility] [address], [place] [address]  P: [phone]  F: [phone]    Patient: Amanda Guzman   : 1970  Diagnosis/ICD-10 Code:  Complete tear of right rotator cuff, unspecified whether traumatic [M75.121]  Referring practitioner: Justin Goode, *  Date of Initial Visit: 5/10/2023  Today's Date: 5/10/2023  Patient seen for 11 sessions                                                                                                                                                                                                                                                                                                                               VISIT#:  sessions authorized per POC (Recert due 2023)     Precautions/Restrictions: R RTC repair on 3/31/2023    Subjective  Amanda Guzman reports that her shoulder is doing better. Not hurting her as much. She has been able to sleep in her bed for ~3-4 hours each night lately. Is propped up on wedge.    Objective  104 degrees passive flex R shoulder  Min tenderness in the shoulder and upper arm    See Exercise, Manual, and Modality Logs for complete treatment.       Assessment/Plan   Pt displays less pain with manual treatment as well as with exercises this date. Still some difficulty with guarding against PROM, however has improved. Able to lift the elbow away from her side with AA flex this date which is improvement from last visit. Modalities at end of session for pain control.     Progress per Plan of Care and Progress strengthening /stabilization /functional activity            Timed:         Manual Therapy:    8     mins  75152;     Therapeutic Exercise:    23     mins  38095;     Neuromuscular Agustin:        mins  03013;    Therapeutic Activity:     8     mins  96354;     Gait Training:           mins  55303;     Ultrasound:          mins  33624;    Ionto                                   mins    97644  Self Care                            mins   77562    Un-Timed:  Electrical Stimulation:    15     mins  85375 ( );  Traction          mins 93854    Timed Treatment:   39   mins   Total Treatment:     54   mins    Pili Pemberton PT, DPT, OCS     [place] License # [id number]

## 2023-05-15 ENCOUNTER — TREATMENT (OUTPATIENT)
Dept: PHYSICAL THERAPY | Facility: CLINIC | Age: 53
End: 2023-05-15
Payer: OTHER MISCELLANEOUS

## 2023-05-15 DIAGNOSIS — M25.511 ACUTE PAIN OF RIGHT SHOULDER: ICD-10-CM

## 2023-05-15 DIAGNOSIS — M75.121 COMPLETE TEAR OF RIGHT ROTATOR CUFF, UNSPECIFIED WHETHER TRAUMATIC: Primary | ICD-10-CM

## 2023-05-15 DIAGNOSIS — Z47.89 ORTHOPEDIC AFTERCARE: ICD-10-CM

## 2023-05-15 NOTE — PROGRESS NOTES
Physical Therapy Daily Note  Office: [address] [facility] [address], [place] [address]  P: [phone]  F: [phone]    Patient: Amanda Guzman   : 1970  Diagnosis/ICD-10 Code:  Complete tear of right rotator cuff, unspecified whether traumatic [M75.121]  Referring practitioner: Justin Goode, *  Date of Initial Visit: 5/15/2023  Today's Date: 5/15/2023  Patient seen for 12 sessions                                                                                                                                                                                                                                                                                                                               VISIT#:  sessions authorized per POC (Recert due 2023)     Precautions/Restrictions: R RTC repair on 3/31/2023    Subjective  Amanda Guzman reports that she feels like shoulder is improving. Not hurting as much. She's able to lie back more on the wedge in bed. Able to sleep almost 5 hours straight last night. Can't remember exactly what MD said about discharging her sling. Has follow-up with     Objective  104 degrees passive flex R shoulder  Min tenderness in the shoulder and upper arm    See Exercise, Manual, and Modality Logs for complete treatment.       Assessment/Plan   Pt displays less pain with manual treatment as well as with exercises this date. Still some difficulty with guarding against PROM, however has improved. Able to lift the elbow away from her side with AA flex this date which is improvement from last visit. Modalities at end of session for pain control.     Progress per Plan of Care and Progress strengthening /stabilization /functional activity            Timed:         Manual Therapy:    8     mins  45425;     Therapeutic Exercise:    23     mins  20314;     Neuromuscular Agustin:        mins  22276;    Therapeutic Activity:     8     mins  90429;     Gait Training:           mins  44444;      Ultrasound:          mins  76283;    Ionto                                   mins   27316  Self Care                            mins   37214    Un-Timed:  Electrical Stimulation:    15     mins  14559 ( );  Traction          mins 80581    Timed Treatment:   39   mins   Total Treatment:     54   mins    Pili Pemberton, PT, DPT, OCS     [place] License # [id number]

## 2023-05-17 ENCOUNTER — TREATMENT (OUTPATIENT)
Dept: PHYSICAL THERAPY | Facility: CLINIC | Age: 53
End: 2023-05-17
Payer: OTHER MISCELLANEOUS

## 2023-05-17 DIAGNOSIS — M75.121 COMPLETE TEAR OF RIGHT ROTATOR CUFF, UNSPECIFIED WHETHER TRAUMATIC: Primary | ICD-10-CM

## 2023-05-17 DIAGNOSIS — Z47.89 ORTHOPEDIC AFTERCARE: ICD-10-CM

## 2023-05-17 DIAGNOSIS — M25.511 ACUTE PAIN OF RIGHT SHOULDER: ICD-10-CM

## 2023-05-17 PROCEDURE — 97110 THERAPEUTIC EXERCISES: CPT | Performed by: PHYSICAL THERAPIST

## 2023-05-17 PROCEDURE — 97112 NEUROMUSCULAR REEDUCATION: CPT | Performed by: PHYSICAL THERAPIST

## 2023-05-17 PROCEDURE — 97140 MANUAL THERAPY 1/> REGIONS: CPT | Performed by: PHYSICAL THERAPIST

## 2023-05-17 PROCEDURE — 97014 ELECTRIC STIMULATION THERAPY: CPT | Performed by: PHYSICAL THERAPIST

## 2023-05-17 NOTE — PROGRESS NOTES
Physical Therapy Daily Note  Office: [address], [place] [address]  P: [phone]  F: [phone]    Patient: Amanda Guzman   : 1970  Diagnosis/ICD-10 Code:  Complete tear of right rotator cuff, unspecified whether traumatic [M75.121]  Referring practitioner: Justin Goode, *  Date of Initial Visit: 2023  Today's Date: 2023  Patient seen for 13 sessions                                                                                                                                                                                                                                                                                                                               VISIT#:  sessions authorized per POC (Recert due 2023)     Precautions/Restrictions: R RTC repair on 3/31/2023    Subjective  Amanda Guzman reports that she is doing well. Shoulder was achy after last session but not bad. Has been working on her AAROM exercises at home and feels like she's improving.     Objective  104 degrees passive flex R shoulder  Min tenderness in the shoulder and upper arm    See Exercise, Manual, and Modality Logs for complete treatment.       Assessment/Plan   Pt displays good progress with AAROM with ability to perform full ROM with chest press in slightly reclined position. Also able to perform AA flex in reclined position, however fatigued quickly. Also fatigued when attempted to add AA abd with pulleys. Recommended that pt buy pulleys for home to assist with ROM.     Progress per Plan of Care and Progress strengthening /stabilization /functional activity            Timed:         Manual Therapy:    8     mins  40037;     Therapeutic Exercise:    23     mins  32673;     Neuromuscular Agustin:        mins  79376;    Therapeutic Activity:     8     mins  36570;     Gait Training:           mins  98789;     Ultrasound:          mins  83895;    Maninderto                                    mins   01331  Self Care                            mins   31451    Un-Timed:  Electrical Stimulation:    15     mins  45996 ( );  Traction          mins 05794    Timed Treatment:   39   mins   Total Treatment:     54   mins    Pili Pemberton PT, DPT, OCS     [place] License # [id number]

## 2023-05-22 ENCOUNTER — TREATMENT (OUTPATIENT)
Dept: PHYSICAL THERAPY | Facility: CLINIC | Age: 53
End: 2023-05-22
Payer: OTHER MISCELLANEOUS

## 2023-05-22 DIAGNOSIS — Z47.89 ORTHOPEDIC AFTERCARE: ICD-10-CM

## 2023-05-22 DIAGNOSIS — M75.121 COMPLETE TEAR OF RIGHT ROTATOR CUFF, UNSPECIFIED WHETHER TRAUMATIC: Primary | ICD-10-CM

## 2023-05-22 DIAGNOSIS — M25.511 ACUTE PAIN OF RIGHT SHOULDER: ICD-10-CM

## 2023-05-22 PROCEDURE — 97140 MANUAL THERAPY 1/> REGIONS: CPT | Performed by: PHYSICAL THERAPIST

## 2023-05-22 PROCEDURE — 97014 ELECTRIC STIMULATION THERAPY: CPT | Performed by: PHYSICAL THERAPIST

## 2023-05-22 PROCEDURE — 97110 THERAPEUTIC EXERCISES: CPT | Performed by: PHYSICAL THERAPIST

## 2023-05-22 NOTE — PROGRESS NOTES
Physical Therapy Daily Note  Office: [address] [facility] [address], [place] [address]  P: [phone]  F: [phone]    Patient: Amanda Guzman   : 1970  Diagnosis/ICD-10 Code:  Complete tear of right rotator cuff, unspecified whether traumatic [M75.121]  Referring practitioner: Justin Goode, *  Date of Initial Visit: 2023  Today's Date: 2023  Patient seen for 14 sessions                                                                                                                                                                                                                                                                                                                               VISIT#:  sessions authorized per POC (Recert due 2023)     Precautions/Restrictions: R RTC repair on 3/31/2023    Subjective  Amanda Guzman reports that her shoulder is more sore today. She has been doing a lot since not having to wear her sling and thinks it's just more sore from that. Also got her pulleys in and has been using them at home. It's going well.     Objective  104 degrees passive flex R shoulder  Min tenderness in the shoulder and upper arm    See Exercise, Manual, and Modality Logs for complete treatment.       Assessment/Plan   Pt displays improved AAROM in semi reclined position as well as with pulleys. Held abd this date due to soreness at start of session. Added wall slides using opp UE to assist which pt had much difficulty doing.     Progress per Plan of Care and Progress strengthening /stabilization /functional activity            Timed:         Manual Therapy:    8     mins  74003;     Therapeutic Exercise:    23     mins  12388;     Neuromuscular Agustin:        mins  93495;    Therapeutic Activity:     8     mins  38226;     Gait Training:           mins  65404;     Ultrasound:          mins  28948;    Ionto                                   mins   40246  Self Care                             mins   90483    Un-Timed:  Electrical Stimulation:    15     mins  75331 ( );  Traction          mins 32831    Timed Treatment:   39   mins   Total Treatment:     54   mins    Pili Pemberton PT, DPT, OCS     [place] License # [id number]

## 2023-05-24 ENCOUNTER — TREATMENT (OUTPATIENT)
Dept: PHYSICAL THERAPY | Facility: CLINIC | Age: 53
End: 2023-05-24

## 2023-05-24 DIAGNOSIS — M25.511 ACUTE PAIN OF RIGHT SHOULDER: ICD-10-CM

## 2023-05-24 DIAGNOSIS — M75.121 COMPLETE TEAR OF RIGHT ROTATOR CUFF, UNSPECIFIED WHETHER TRAUMATIC: Primary | ICD-10-CM

## 2023-05-24 DIAGNOSIS — Z47.89 ORTHOPEDIC AFTERCARE: ICD-10-CM

## 2023-05-25 ENCOUNTER — TELEPHONE (OUTPATIENT)
Dept: ORTHOPEDIC SURGERY | Facility: CLINIC | Age: 53
End: 2023-05-25
Payer: COMMERCIAL

## 2023-05-25 NOTE — TELEPHONE ENCOUNTER
Caller: Amanda Guzman    Relationship: Self    Best call back number: [phone]    What form or medical record are you requestin/01 PROG NOTES    Who is requesting this form or medical record from you: SEMAJ FROM [employer]    How would you like to receive the form or medical records (pick-up, mail, fax): FAX  [phone]    Timeframe paperwork needed: ASAP

## 2023-05-31 ENCOUNTER — TREATMENT (OUTPATIENT)
Dept: PHYSICAL THERAPY | Facility: CLINIC | Age: 53
End: 2023-05-31
Payer: OTHER MISCELLANEOUS

## 2023-05-31 DIAGNOSIS — Z47.89 ORTHOPEDIC AFTERCARE: ICD-10-CM

## 2023-05-31 DIAGNOSIS — M75.121 COMPLETE TEAR OF RIGHT ROTATOR CUFF, UNSPECIFIED WHETHER TRAUMATIC: Primary | ICD-10-CM

## 2023-05-31 NOTE — PROGRESS NOTES
"Physical Therapy Daily Note  Office: [address], [place] [address]  P: [phone]  F: [phone]    Patient: Amanda Guzman   : 1970  Diagnosis/ICD-10 Code:  Complete tear of right rotator cuff, unspecified whether traumatic [M75.121]  Referring practitioner: Justin Goode, *  Date of Initial Visit: 2023  Today's Date: 2023  Patient seen for 15 sessions                                                                                                                                                                                                                                                                                                                               VISIT#: 15 sessions authorized per POC (Recert due 2023)     Precautions/Restrictions: R RTC repair on 3/31/2023    Subjective  Amanda Guzman reports that movement in her shoulder is getting better. The pulleys are really helping. Not having as much pain either.     Objective  104 degrees passive flex R shoulder  Min tenderness in the shoulder and upper arm    See Exercise, Manual, and Modality Logs for complete treatment.       Assessment/Plan   Pt displays good progress with AAROM in semi-reclined position. Able to tolerate lying supine better, however still has discomfort from \"pulling\" in the back of the shoulder and some in scapula. Some increased pain by end of session as well as mod fatigue. Modalities at end of session for pain control.     Progress per Plan of Care and Progress strengthening /stabilization /functional activity            Timed:         Manual Therapy:    8     mins  48498;     Therapeutic Exercise:    14     mins  09861;     Neuromuscular Agustin:        mins  83394;    Therapeutic Activity:     8     mins  90266;     Gait Training:           mins  56714;     Ultrasound:          mins  56297;    Ionto                                   mins   61908  Self Care                            " mins   84419    Un-Timed:  Electrical Stimulation:    15     mins  80521 ( );  Traction          mins 87861    Timed Treatment:   30   mins   Total Treatment:     45   mins (pt in clinic for ~56 mins total)     Pili Pemberton PT, DPT, OCS     [place] License # [id number]

## 2023-06-01 ENCOUNTER — OFFICE VISIT (OUTPATIENT)
Dept: ORTHOPEDIC SURGERY | Facility: CLINIC | Age: 53
End: 2023-06-01

## 2023-06-01 VITALS — WEIGHT: 239 LBS | HEART RATE: 92 BPM | BODY MASS INDEX: 37.51 KG/M2 | HEIGHT: 67 IN

## 2023-06-01 DIAGNOSIS — Z47.89 ORTHOPEDIC AFTERCARE: Primary | ICD-10-CM

## 2023-06-01 PROCEDURE — 99024 POSTOP FOLLOW-UP VISIT: CPT | Performed by: ORTHOPAEDIC SURGERY

## 2023-06-01 NOTE — PROGRESS NOTES
"     Patient ID: Amanda Guzman is a 52 y.o. female.    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

## 2023-06-05 ENCOUNTER — TELEPHONE (OUTPATIENT)
Dept: ORTHOPEDIC SURGERY | Facility: CLINIC | Age: 53
End: 2023-06-05
Payer: COMMERCIAL

## 2023-06-05 ENCOUNTER — TREATMENT (OUTPATIENT)
Dept: PHYSICAL THERAPY | Facility: CLINIC | Age: 53
End: 2023-06-05
Payer: OTHER MISCELLANEOUS

## 2023-06-05 DIAGNOSIS — Z47.89 ORTHOPEDIC AFTERCARE: ICD-10-CM

## 2023-06-05 DIAGNOSIS — M25.511 ACUTE PAIN OF RIGHT SHOULDER: ICD-10-CM

## 2023-06-05 DIAGNOSIS — M75.121 COMPLETE TEAR OF RIGHT ROTATOR CUFF, UNSPECIFIED WHETHER TRAUMATIC: Primary | ICD-10-CM

## 2023-06-05 NOTE — TELEPHONE ENCOUNTER
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 SHE WOULD LIKE A UPDATED WORK NOTE THAT INCLUDES NO NURSING ASSESSMENTS PLEASE AND CALL WHEN READY

## 2023-06-05 NOTE — PROGRESS NOTES
Physical Therapy Daily Note  Office: [address] [facility] [address], [place] [address]  P: [phone]  F: [phone]    Patient: Amanda Guzman   : 1970  Diagnosis/ICD-10 Code:  Complete tear of right rotator cuff, unspecified whether traumatic [M75.121]  Referring practitioner: Justin Goode, *  Date of Initial Visit: 2023  Today's Date: 2023  Patient seen for 17 sessions                                                                                                                                                                                                                                                                                                                               VISIT#:  sessions authorized per POC (Recert due 2023)     Precautions/Restrictions: R RTC repair on 3/31/2023    Subjective  Amanda Guzman reports that she is doing well. 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 She is waiting to hear back from her job to see if they have anything that she can do. She feels like having the shoulder pulleys at home has really helped a lot.     Objective  Min tenderness in the shoulder and upper arm    See Exercise, Manual, and Modality Logs for complete treatment.       Assessment/Plan   Pt displays good progress with AAROM of the shoulder. Able to initiate AROM chest press in supine. Min pain, just much difficulty. Pt demonstrates good fatigue by end of session.     Progress per Plan of Care and Progress strengthening /stabilization /functional activity            Timed:         Manual Therapy:    8     mins  31847;     Therapeutic Exercise:    23     mins  68130;     Neuromuscular Agustin:        mins  82697;    Therapeutic Activity:     8     mins  21115;     Gait Training:           mins  97619;     Ultrasound:          mins  76243;    Ionto                                   mins   32747  Self Care                            mins    28235    Un-Timed:  Electrical Stimulation:    15     mins  02940 ( );  Traction          mins 83052    Timed Treatment:   39   mins   Total Treatment:     54   mins    Pili Pemberton, PT, DPT, OCS     [place] License # [id number]

## 2023-06-05 NOTE — PROGRESS NOTES
Physical Therapy Daily Note  Office: [address] [facility] [address], [place] [address]  P: [phone]  F: [phone]    Patient: Amanda Guzman   : 1970  Diagnosis/ICD-10 Code:  Complete tear of right rotator cuff, unspecified whether traumatic [M75.121]  Referring practitioner: Justin Goode, *  Date of Initial Visit: 2023  Today's Date: 2023  Patient seen for 16 sessions                                                                                                                                                                                                                                                                                                                               VISIT#: 16 sessions authorized per POC (Recert due 2023)     Precautions/Restrictions: R RTC repair on 3/31/2023    Subjective  Amanda Guzman reports that she is still having aching in shoulder but it does seem to be moving better.     Objective  Min tenderness in the shoulder and upper arm    See Exercise, Manual, and Modality Logs for complete treatment.       Assessment/Plan   Pt displays good progress with AAROM of the shoulder. Able to lie supine with folded pillow underneath her shoulder. Improved PROM as well with less muscle guarding. Pt continues to make good progress overall.     Progress per Plan of Care and Progress strengthening /stabilization /functional activity            Timed:         Manual Therapy:    8     mins  32463;     Therapeutic Exercise:    8     mins  47941;     Neuromuscular Agustin:        mins  84269;    Therapeutic Activity:     8     mins  29705;     Gait Training:           mins  02497;     Ultrasound:          mins  37499;    Ionto                                   mins   82015  Self Care                            mins   28022    Un-Timed:  Electrical Stimulation:    15     mins  21449 ( );  Traction          mins 64081    Timed Treatment:   24   mins   Total Treatment:      39   mins (pt in clinic for ~56 mins total)     Pili Pemberton, PT, DPT, OCS     [place] License # [id number]

## 2023-06-07 ENCOUNTER — TELEPHONE (OUTPATIENT)
Dept: ORTHOPEDIC SURGERY | Facility: CLINIC | Age: 53
End: 2023-06-07
Payer: COMMERCIAL

## 2023-06-07 ENCOUNTER — TELEPHONE (OUTPATIENT)
Dept: PHYSICAL THERAPY | Facility: CLINIC | Age: 53
End: 2023-06-07

## 2023-06-07 NOTE — TELEPHONE ENCOUNTER
Caller: SEMAJ    Relationship:     Best call back number: [phone]    What form or medical record are you requesting: OFFICE NOTES 6/1 AND WORK STATUS    Who is requesting this form or medical record from you: WORKER'S [employer]    How would you like to receive the form or medical records (pick-up, mail, fax): FAX  If fax, what is the fax number: [phone]    Timeframe paperwork needed: ASAP    Additional notes: N/A

## 2023-06-07 NOTE — TELEPHONE ENCOUNTER
Caller: Amanda Guzman    Relationship: Self       What was the call regarding: HAS TO BABYSIT TODAY

## 2023-06-08 ENCOUNTER — TREATMENT (OUTPATIENT)
Dept: PHYSICAL THERAPY | Facility: CLINIC | Age: 53
End: 2023-06-08
Payer: OTHER MISCELLANEOUS

## 2023-06-08 ENCOUNTER — TELEPHONE (OUTPATIENT)
Dept: ORTHOPEDIC SURGERY | Facility: CLINIC | Age: 53
End: 2023-06-08
Payer: COMMERCIAL

## 2023-06-08 DIAGNOSIS — M75.121 COMPLETE TEAR OF RIGHT ROTATOR CUFF, UNSPECIFIED WHETHER TRAUMATIC: Primary | ICD-10-CM

## 2023-06-08 DIAGNOSIS — Z47.89 ORTHOPEDIC AFTERCARE: ICD-10-CM

## 2023-06-08 DIAGNOSIS — M25.511 ACUTE PAIN OF RIGHT SHOULDER: ICD-10-CM

## 2023-06-08 PROCEDURE — 97112 NEUROMUSCULAR REEDUCATION: CPT | Performed by: PHYSICAL THERAPIST

## 2023-06-08 PROCEDURE — 97140 MANUAL THERAPY 1/> REGIONS: CPT | Performed by: PHYSICAL THERAPIST

## 2023-06-08 PROCEDURE — 97110 THERAPEUTIC EXERCISES: CPT | Performed by: PHYSICAL THERAPIST

## 2023-06-08 PROCEDURE — 97014 ELECTRIC STIMULATION THERAPY: CPT | Performed by: PHYSICAL THERAPIST

## 2023-06-08 NOTE — TELEPHONE ENCOUNTER
Caller: VAL ROJAS      Relationship to patient: DAGOBERTO    Best call back number: [phone]    Patient is needing: RETURNING CALL, UNABLE TO WT

## 2023-06-08 NOTE — PROGRESS NOTES
Physical Therapy Daily Note  Office: [address] [facility] [address], [place] [address]  P: [phone]  F: [phone]    Patient: Amanda Guzman   : 1970  Diagnosis/ICD-10 Code:  Complete tear of right rotator cuff, unspecified whether traumatic [M75.121]  Referring practitioner: Justin Goode, *  Date of Initial Visit: 2023  Today's Date: 2023  Patient seen for 18 sessions                                                                                                                                                                                                                                                                                                                               VISIT#:  sessions authorized per POC (Recert due 2023)     Precautions/Restrictions: R RTC repair on 3/31/2023    Subjective  Amanda Guzman reports that she feels like her shoulder motion is getting better. Pulleys are still really helping her.     Objective  Min tenderness in the shoulder and upper arm    See Exercise, Manual, and Modality Logs for complete treatment.       Assessment/Plan   Pt displays good progress with AAROM and able to perform supine AROM this date with less pain. Still much difficulty though, due to weakness in the shoulder. Modalities at end of session for pain control.     Progress per Plan of Care and Progress strengthening /stabilization /functional activity            Timed:         Manual Therapy:    8     mins  72024;     Therapeutic Exercise:    23     mins  94971;     Neuromuscular Agustin:        mins  66735;    Therapeutic Activity:     8     mins  32459;     Gait Training:           mins  23800;     Ultrasound:          mins  60721;    Ionto                                   mins   57286  Self Care                            mins   86221    Un-Timed:  Electrical Stimulation:    15     mins  48743 ( );  Traction          mins 38808    Timed Treatment:   39   mins   Total  Treatment:     54   mins    Pili Pemberton, PT, DPT, OCS     [place] License # [id number]

## 2023-06-08 NOTE — TELEPHONE ENCOUNTER
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     Jacob went on to ask if patient can stand and without using her right arm.  I told him I would send a message to Dr. Goode to see what he says.

## 2023-06-08 NOTE — TELEPHONE ENCOUNTER
Caller: VAL ROJAS   Relationship to Patient: EMPLOYER   Phone Number: [phone]  Reason for Call: PATIENTS EMPLOYER CALLING ASKING FOR CLARIFICATION ON WORK STATUS

## 2023-06-09 NOTE — TELEPHONE ENCOUNTER
I called and s/w Jacob and told him that patient can stand at work as long as she doesnt use her arm.  Jacob understood and thanks us for getting back with him.

## 2023-06-14 ENCOUNTER — TREATMENT (OUTPATIENT)
Dept: PHYSICAL THERAPY | Facility: CLINIC | Age: 53
End: 2023-06-14
Payer: OTHER MISCELLANEOUS

## 2023-06-14 DIAGNOSIS — M75.121 COMPLETE TEAR OF RIGHT ROTATOR CUFF, UNSPECIFIED WHETHER TRAUMATIC: Primary | ICD-10-CM

## 2023-06-14 DIAGNOSIS — M25.511 ACUTE PAIN OF RIGHT SHOULDER: ICD-10-CM

## 2023-06-14 DIAGNOSIS — Z47.89 ORTHOPEDIC AFTERCARE: ICD-10-CM

## 2023-06-14 NOTE — PROGRESS NOTES
Physical Therapy Daily Note  Office: [address] [facility] [address], [place] [address]  P: [phone]  F: [phone]    Patient: Amanda Guzman   : 1970  Diagnosis/ICD-10 Code:  Complete tear of right rotator cuff, unspecified whether traumatic [M75.121]  Referring practitioner: Justin Goode, *  Date of Initial Visit: 2023  Today's Date: 2023  Patient seen for 19 sessions                                                                                                                                                                                                                                                                                                                               VISIT#:  sessions authorized per POC (Recert due 2023)     Precautions/Restrictions: R RTC repair on 3/31/2023    Subjective  Amanda Guzman reports that her shoulder is feeling better. Moving more and not hurting as much. She has been working on her exercises at home and they are going well.     Objective  134 degrees AA flex supine; AA ER to neutral supine     See Exercise, Manual, and Modality Logs for complete treatment.       Assessment/Plan   Pt displays good improvement with flex AAROM as well as AROM in supine, however continues to have limited ER. Instructed pt to focus on AA stretch at home and to add supine active flexion to work on strength. Pt continues to make progress overall.     Progress per Plan of Care and Progress strengthening /stabilization /functional activity            Timed:         Manual Therapy:    8     mins  16511;     Therapeutic Exercise:    23     mins  85366;     Neuromuscular Agustin:        mins  58064;    Therapeutic Activity:     8     mins  22436;     Gait Training:           mins  55178;     Ultrasound:          mins  06557;    Ionto                                   mins   94978  Self Care                            mins   33076    Un-Timed:  Electrical Stimulation:     15     mins  83034 ( );  Traction          mins 17598    Timed Treatment:   39   mins   Total Treatment:     54   mins    Pili Pemberton, PT, DPT, OCS     [place] License # [id number]

## 2023-07-21 ENCOUNTER — TREATMENT (OUTPATIENT)
Dept: PHYSICAL THERAPY | Facility: CLINIC | Age: 53
End: 2023-07-21
Payer: OTHER MISCELLANEOUS

## 2023-07-21 DIAGNOSIS — Z47.89 ORTHOPEDIC AFTERCARE: ICD-10-CM

## 2023-07-21 DIAGNOSIS — M75.121 COMPLETE TEAR OF RIGHT ROTATOR CUFF, UNSPECIFIED WHETHER TRAUMATIC: Primary | ICD-10-CM

## 2023-07-21 DIAGNOSIS — M25.511 ACUTE PAIN OF RIGHT SHOULDER: ICD-10-CM

## 2023-07-21 PROCEDURE — 97014 ELECTRIC STIMULATION THERAPY: CPT | Performed by: PHYSICAL THERAPIST

## 2023-07-21 PROCEDURE — 97110 THERAPEUTIC EXERCISES: CPT | Performed by: PHYSICAL THERAPIST

## 2023-07-21 PROCEDURE — 97112 NEUROMUSCULAR REEDUCATION: CPT | Performed by: PHYSICAL THERAPIST

## 2023-07-21 NOTE — PROGRESS NOTES
Physical Therapy Daily Note  Office: [address] [facility] [address], [place] [address]  P: [phone]  F: [phone]    Patient: Amanda Guzman   : 1970  Diagnosis/ICD-10 Code:  Complete tear of right rotator cuff, unspecified whether traumatic [M75.121]  Referring practitioner: Justin Goode, *  Date of Initial Visit: 2023  Today's Date: 2023  Patient seen for 29 sessions                                                                                                                                                                                                                                                                                                                               VISIT#:  sessions authorized per POC     Precautions/Restrictions: R RTC repair on 3/31/2023    Subjective  Amanda Guzman reports that her shoulder ROM is improving. No new complaints.     Objective  134 degrees AA flex supine; AA ER to neutral supine     See Exercise, Manual, and Modality Logs for complete treatment.       Assessment/Plan   Pt displays good progress with strengthening. Good fatigue by end of session. No c/o pain with exercises, just fatigue in the muscles. Modalities at end of session for pain control.     Progress per Plan of Care and Progress strengthening /stabilization /functional activity            Timed:         Manual Therapy:         mins  66287;     Therapeutic Exercise:    23     mins  58662;     Neuromuscular Agustin:   8     mins  41530;    Therapeutic Activity:          mins  94903;     Gait Training:           mins  50823;     Ultrasound:          mins  15971;    Ionto                                   mins   12640  Self Care                            mins   10855    Un-Timed:  Electrical Stimulation:  15       mins  97176 ( );  Traction          mins 10402    Timed Treatment:   31   mins   Total Treatment:     46   mins (pt in clinic for ~55 mins total)     Pili  Nilay, PT, DPT, OCS     [place] License # [id number]

## 2023-07-24 ENCOUNTER — TREATMENT (OUTPATIENT)
Dept: PHYSICAL THERAPY | Facility: CLINIC | Age: 53
End: 2023-07-24
Payer: OTHER MISCELLANEOUS

## 2023-07-24 DIAGNOSIS — Z47.89 ORTHOPEDIC AFTERCARE: ICD-10-CM

## 2023-07-24 DIAGNOSIS — M25.511 ACUTE PAIN OF RIGHT SHOULDER: ICD-10-CM

## 2023-07-24 DIAGNOSIS — M75.121 COMPLETE TEAR OF RIGHT ROTATOR CUFF, UNSPECIFIED WHETHER TRAUMATIC: Primary | ICD-10-CM
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
## 2023-07-28 ENCOUNTER — TREATMENT (OUTPATIENT)
Dept: PHYSICAL THERAPY | Facility: CLINIC | Age: 53
End: 2023-07-28
Payer: OTHER MISCELLANEOUS

## 2023-07-28 DIAGNOSIS — M25.511 ACUTE PAIN OF RIGHT SHOULDER: ICD-10-CM

## 2023-07-28 DIAGNOSIS — M75.121 COMPLETE TEAR OF RIGHT ROTATOR CUFF, UNSPECIFIED WHETHER TRAUMATIC: Primary | ICD-10-CM

## 2023-07-28 DIAGNOSIS — Z47.89 ORTHOPEDIC AFTERCARE: ICD-10-CM

## 2023-07-28 PROCEDURE — 97014 ELECTRIC STIMULATION THERAPY: CPT | Performed by: PHYSICAL THERAPIST

## 2023-07-28 PROCEDURE — 97110 THERAPEUTIC EXERCISES: CPT | Performed by: PHYSICAL THERAPIST

## 2023-07-28 PROCEDURE — 97530 THERAPEUTIC ACTIVITIES: CPT | Performed by: PHYSICAL THERAPIST

## 2023-07-28 PROCEDURE — 97112 NEUROMUSCULAR REEDUCATION: CPT | Performed by: PHYSICAL THERAPIST

## 2023-07-31 ENCOUNTER — OFFICE VISIT (OUTPATIENT)
Dept: ORTHOPEDIC SURGERY | Facility: CLINIC | Age: 53
End: 2023-07-31
Payer: OTHER MISCELLANEOUS

## 2023-07-31 VITALS — BODY MASS INDEX: 38.57 KG/M2 | WEIGHT: 240 LBS | HEART RATE: 98 BPM | HEIGHT: 66 IN

## 2023-07-31 DIAGNOSIS — M75.121 COMPLETE TEAR OF RIGHT ROTATOR CUFF, UNSPECIFIED WHETHER TRAUMATIC: Primary | ICD-10-CM

## 2023-08-01 ENCOUNTER — TREATMENT (OUTPATIENT)
Dept: PHYSICAL THERAPY | Facility: CLINIC | Age: 53
End: 2023-08-01
Payer: OTHER MISCELLANEOUS

## 2023-08-01 DIAGNOSIS — Z47.89 ORTHOPEDIC AFTERCARE: ICD-10-CM

## 2023-08-01 DIAGNOSIS — M25.511 ACUTE PAIN OF RIGHT SHOULDER: ICD-10-CM

## 2023-08-01 DIAGNOSIS — M75.121 COMPLETE TEAR OF RIGHT ROTATOR CUFF, UNSPECIFIED WHETHER TRAUMATIC: Primary | ICD-10-CM

## 2023-08-01 PROCEDURE — 97110 THERAPEUTIC EXERCISES: CPT | Performed by: PHYSICAL THERAPIST

## 2023-08-01 PROCEDURE — 97014 ELECTRIC STIMULATION THERAPY: CPT | Performed by: PHYSICAL THERAPIST

## 2023-08-01 PROCEDURE — 97112 NEUROMUSCULAR REEDUCATION: CPT | Performed by: PHYSICAL THERAPIST

## 2023-08-01 PROCEDURE — 97530 THERAPEUTIC ACTIVITIES: CPT | Performed by: PHYSICAL THERAPIST

## 2023-08-03 ENCOUNTER — TREATMENT (OUTPATIENT)
Dept: PHYSICAL THERAPY | Facility: CLINIC | Age: 53
End: 2023-08-03
Payer: OTHER MISCELLANEOUS

## 2023-08-03 DIAGNOSIS — Z47.89 ORTHOPEDIC AFTERCARE: ICD-10-CM

## 2023-08-03 DIAGNOSIS — M75.121 COMPLETE TEAR OF RIGHT ROTATOR CUFF, UNSPECIFIED WHETHER TRAUMATIC: Primary | ICD-10-CM

## 2023-08-03 DIAGNOSIS — M25.511 ACUTE PAIN OF RIGHT SHOULDER: ICD-10-CM

## 2023-08-03 PROCEDURE — 97110 THERAPEUTIC EXERCISES: CPT | Performed by: PHYSICAL THERAPIST

## 2023-08-03 PROCEDURE — 97140 MANUAL THERAPY 1/> REGIONS: CPT | Performed by: PHYSICAL THERAPIST

## 2023-08-14 ENCOUNTER — TELEPHONE (OUTPATIENT)
Dept: ORTHOPEDIC SURGERY | Facility: CLINIC | Age: 53
End: 2023-08-14
Payer: COMMERCIAL

## 2023-08-14 DIAGNOSIS — M75.121 COMPLETE TEAR OF RIGHT ROTATOR CUFF, UNSPECIFIED WHETHER TRAUMATIC: Primary | ICD-10-CM

## 2023-08-14 NOTE — TELEPHONE ENCOUNTER
PATIENT CALLED IN ([phone]) REQUESTING A REFERRAL FOR PHYSICAL THERAPY TO BE SENT TO [facility] IN [place]. PATIENT SPOKE WITH ANJUM IN OFFICE PHONE NUMBER ([phone]). UNABLE TO DO THERAPY TODAY DUE TO NOT HAVING REFERRAL. PLEASE ADVISE

## 2023-08-15 ENCOUNTER — TELEPHONE (OUTPATIENT)
Dept: ORTHOPEDIC SURGERY | Facility: CLINIC | Age: 53
End: 2023-08-15
Payer: COMMERCIAL

## 2023-08-15 NOTE — TELEPHONE ENCOUNTER
Celia from [facility] physical therapy in [place] called in requesting a new referral for services to be continued. Patient called yesterday requesting also. Celia requested to just put referral in the system.

## 2023-08-22 ENCOUNTER — TREATMENT (OUTPATIENT)
Dept: PHYSICAL THERAPY | Facility: CLINIC | Age: 53
End: 2023-08-22
Payer: OTHER MISCELLANEOUS

## 2023-08-22 DIAGNOSIS — Z47.89 ORTHOPEDIC AFTERCARE: ICD-10-CM

## 2023-08-22 DIAGNOSIS — M25.511 ACUTE PAIN OF RIGHT SHOULDER: ICD-10-CM

## 2023-08-22 DIAGNOSIS — M75.121 COMPLETE TEAR OF RIGHT ROTATOR CUFF, UNSPECIFIED WHETHER TRAUMATIC: Primary | ICD-10-CM

## 2023-08-22 NOTE — PROGRESS NOTES
Physical Therapy Daily Note  Office: [address] [facility] [address], [place] [address]  P: [phone]  F: [phone]    Patient: Amanda Guzman   : 1970  Diagnosis/ICD-10 Code:  Complete tear of right rotator cuff, unspecified whether traumatic [M75.121]  Referring practitioner: Justin Goode, *  Date of Initial Visit: 2023  Today's Date: 2023  Patient seen for 34 sessions                                                                                                                                                                                                                                                                                                                               VISIT#: 34/33 sessions authorized per ins     Precautions/Restrictions: R RTC repair on 3/31/2023    Subjective  Amanda Guzman reports that her shoulder isn't hurting as much. Strength and motion do seem to be improving some. Took a while to get a new order from surgeon. She had a hard time getting a hold of worker's comp adjustor to get more visits approved after her vacation.     Objective  134 degrees AA flex supine; AA ER to neutral supine     See Exercise, Manual, and Modality Logs for complete treatment.       Assessment/Plan   Pt demonstrates good progress with strengthening of the shoulder. Able to perform all exercises well. Pt given more stretching at end of session to avoid increased soreness or muscle cramping. Modalities at end of session for pain control as well.     Progress per Plan of Care and Progress strengthening /stabilization /functional activity            Timed:         Manual Therapy:    8     mins  48770;     Therapeutic Exercise:    23     mins  52203;     Neuromuscular Agustin:   8     mins  71782;    Therapeutic Activity:          mins  67983;     Gait Training:           mins  61078;     Ultrasound:          mins  36433;    Ionto                                   mins   29200  Self Care                             mins   33220    Un-Timed:  Electrical Stimulation:  15       mins  50739 ( );  Traction          mins 38380    Timed Treatment:   39   mins   Total Treatment:     54   mins     Pili Pemberton PT, DPT, OCS     [place] License # [id number]

## 2023-08-24 ENCOUNTER — TREATMENT (OUTPATIENT)
Dept: PHYSICAL THERAPY | Facility: CLINIC | Age: 53
End: 2023-08-24
Payer: OTHER MISCELLANEOUS

## 2023-08-24 DIAGNOSIS — M25.511 ACUTE PAIN OF RIGHT SHOULDER: ICD-10-CM

## 2023-08-24 DIAGNOSIS — Z47.89 ORTHOPEDIC AFTERCARE: ICD-10-CM

## 2023-08-24 DIAGNOSIS — M75.121 COMPLETE TEAR OF RIGHT ROTATOR CUFF, UNSPECIFIED WHETHER TRAUMATIC: Primary | ICD-10-CM

## 2023-08-24 NOTE — PROGRESS NOTES
Physical Therapy Daily Note  Office: [address] [facility] [address], [place] [address]  P: [phone]  F: [phone]    Patient: Amanda Guzman   : 1970  Diagnosis/ICD-10 Code:  Complete tear of right rotator cuff, unspecified whether traumatic [M75.121]  Referring practitioner: Justin Goode, *  Date of Initial Visit: 2023  Today's Date: 2023  Patient seen for 35 sessions                                                                                                                                                                                                                                                                                                                               VISIT#: 35/45 sessions authorized per ins     Precautions/Restrictions: R RTC repair on 3/31/2023    Subjective  Amanda Guzman reports that her shoulder felt fine after last session. No new complaints.     Objective  134 degrees AA flex supine; AA ER to neutral supine     See Exercise, Manual, and Modality Logs for complete treatment.       Assessment/Plan   Pt continues to have improved strength in the R shoulder with exercises. Able to perform exercises well with light weights with mod fatigue by end of session. Pt is progressing well overall with strength and function.     Progress per Plan of Care and Progress strengthening /stabilization /functional activity            Timed:         Manual Therapy:    8     mins  06893;     Therapeutic Exercise:    23     mins  81679;     Neuromuscular Agustin:   8     mins  66506;    Therapeutic Activity:          mins  98502;     Gait Training:           mins  03008;     Ultrasound:          mins  48715;    Ionto                                   mins   23988  Self Care                            mins   36449    Un-Timed:  Electrical Stimulation:  15       mins  88088 ( );  Traction          mins 54591    Timed Treatment:   39   mins   Total Treatment:     54   mins      Pili Pemberton, PT, DPT, OCS     [place] License # [id number]

## 2023-08-29 ENCOUNTER — TREATMENT (OUTPATIENT)
Dept: PHYSICAL THERAPY | Facility: CLINIC | Age: 53
End: 2023-08-29
Payer: OTHER MISCELLANEOUS

## 2023-08-29 ENCOUNTER — OFFICE VISIT (OUTPATIENT)
Dept: ORTHOPEDIC SURGERY | Facility: CLINIC | Age: 53
End: 2023-08-29
Payer: OTHER MISCELLANEOUS

## 2023-08-29 VITALS — OXYGEN SATURATION: 96 % | WEIGHT: 240 LBS | HEART RATE: 97 BPM | HEIGHT: 66 IN | BODY MASS INDEX: 38.57 KG/M2

## 2023-08-29 DIAGNOSIS — M25.511 ACUTE PAIN OF RIGHT SHOULDER: ICD-10-CM

## 2023-08-29 DIAGNOSIS — M75.121 COMPLETE TEAR OF RIGHT ROTATOR CUFF, UNSPECIFIED WHETHER TRAUMATIC: Primary | ICD-10-CM

## 2023-08-29 DIAGNOSIS — Z47.89 ORTHOPEDIC AFTERCARE: ICD-10-CM

## 2023-08-29 DIAGNOSIS — Z47.89 ORTHOPEDIC AFTERCARE: Primary | ICD-10-CM

## 2023-08-29 PROCEDURE — 97014 ELECTRIC STIMULATION THERAPY: CPT | Performed by: PHYSICAL THERAPIST

## 2023-08-29 PROCEDURE — 97110 THERAPEUTIC EXERCISES: CPT | Performed by: PHYSICAL THERAPIST

## 2023-08-29 PROCEDURE — 97140 MANUAL THERAPY 1/> REGIONS: CPT | Performed by: PHYSICAL THERAPIST

## 2023-08-29 PROCEDURE — 97112 NEUROMUSCULAR REEDUCATION: CPT | Performed by: PHYSICAL THERAPIST

## 2023-08-29 RX ORDER — FLUOXETINE HYDROCHLORIDE 40 MG/1
40 CAPSULE ORAL DAILY
COMMUNITY
Start: 2023-08-03

## 2023-08-29 NOTE — PROGRESS NOTES
"   Patient ID: Amanda Guzman is a 52 y.o. female presents for follow up on 3/31/23 right shoulder arthroscopy with capsular release and supraspinatus repair. Reports being delayed by 19 days to get re-entered to physical therapy, which she does not believe caused any regression but no progression of her recovery.       Objective:  Pulse 97   Ht 167.6 cm (66\")   Wt 109 kg (240 lb)   SpO2 96%   BMI 38.74 kg/mý     Physical Examination:       Right shoulder:  Intact skin. No ecchymosis. No effusion  Mild tenderness over the anterior aspect  Passive forward flexion 165, abduction 130,   Active ER 20, IR Right lumbar spine about L3    Imaging:   None    Assessment:    Diagnoses and all orders for this visit:    1. Orthopedic aftercare (Primary)       Plan: Recommend continue therapy until next follow up in 4 weeks. 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 No heavy lifting with right upper extremity. 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     Disclaimer: Part of this note may be an electronic transcription/translation of spoken language to printed text using the Dragon Dictation System  "

## 2023-08-29 NOTE — PROGRESS NOTES
Physical Therapy Daily Note  Office: [address] [facility] [address], [place] [address]  P: [phone]  F: [phone]    Patient: Amanda Guzman   : 1970  Diagnosis/ICD-10 Code:  Complete tear of right rotator cuff, unspecified whether traumatic [M75.121]  Referring practitioner: Justin Goode, *  Date of Initial Visit: 2023  Today's Date: 2023  Patient seen for 36 sessions                                                                                                                                                                                                                                                                                                                               VISIT#: 36/45 sessions authorized per ins     Precautions/Restrictions: R RTC repair on 3/31/2023    Subjective  Amnada Guzman reports that her shoulder is a little sore but not much. Doing fine overall. Getting stronger.     Objective  134 degrees AA flex supine; AA ER to neutral supine     See Exercise, Manual, and Modality Logs for complete treatment.       Assessment/Plan   Pt demonstrates good progression with strength exercises. Mod fatigue noted with strengthening. Pt continues to have some difficulty with raising arm OH, however is improving.     Progress per Plan of Care and Progress strengthening /stabilization /functional activity            Timed:         Manual Therapy:    8     mins  90220;     Therapeutic Exercise:    23     mins  03498;     Neuromuscular Agustin:   8     mins  43710;    Therapeutic Activity:          mins  37823;     Gait Training:           mins  00444;     Ultrasound:          mins  88585;    Ionto                                   mins   90668  Self Care                            mins   50386    Un-Timed:  Electrical Stimulation:  15       mins  27997 ( );  Traction          mins 42458    Timed Treatment:   39   mins   Total Treatment:     54   mins     Pili Pemberton, PT,  OTTONIEL, EPHRAIM     [place] License # [id number]

## 2023-08-31 ENCOUNTER — TREATMENT (OUTPATIENT)
Dept: PHYSICAL THERAPY | Facility: CLINIC | Age: 53
End: 2023-08-31
Payer: OTHER MISCELLANEOUS

## 2023-08-31 DIAGNOSIS — M25.511 ACUTE PAIN OF RIGHT SHOULDER: ICD-10-CM

## 2023-08-31 DIAGNOSIS — Z47.89 ORTHOPEDIC AFTERCARE: ICD-10-CM

## 2023-08-31 DIAGNOSIS — M75.121 COMPLETE TEAR OF RIGHT ROTATOR CUFF, UNSPECIFIED WHETHER TRAUMATIC: Primary | ICD-10-CM

## 2023-08-31 PROCEDURE — 97110 THERAPEUTIC EXERCISES: CPT | Performed by: PHYSICAL THERAPIST

## 2023-08-31 PROCEDURE — 97014 ELECTRIC STIMULATION THERAPY: CPT | Performed by: PHYSICAL THERAPIST

## 2023-08-31 PROCEDURE — 97140 MANUAL THERAPY 1/> REGIONS: CPT | Performed by: PHYSICAL THERAPIST

## 2023-08-31 NOTE — PROGRESS NOTES
Physical Therapy Daily Note  Office: [address] [facility] [address], [place] [address]  P: [phone]  F: [phone]    Patient: Amanda Guzman   : 1970  Diagnosis/ICD-10 Code:  Complete tear of right rotator cuff, unspecified whether traumatic [M75.121]  Referring practitioner: Justin Goode, *  Date of Initial Visit: 2023  Today's Date: 2023  Patient seen for 37 sessions                                                                                                                                                                                                                                                                                                                               VISIT#: 37/45 sessions authorized per ins     Precautions/Restrictions: R RTC repair on 3/31/2023    Subjective  Amanda Guzman reports that she is having more soreness in her arm today. Feels it in the shoulder as well as her forearm (anterior). She did some exercises with her band this morning to try to loosen up and it's been sore since then. Did band exercises yesterday too.     Objective  134 degrees AA flex supine; AA ER to neutral supine     See Exercise, Manual, and Modality Logs for complete treatment.       Assessment/Plan   Pt demonstrates increased muscle guarding in forearm flexor muscles that improved with STM. Improved soft tissue mobility in the shoulder with manual treatment as well. Focused on stretches today and avoided strengthening due to soreness. Instructed pt to only perform band exercises one day at home as long as she is coming to PT twice a week and then focus on stretches and ROM exercises the other days.     Progress per Plan of Care and Progress strengthening /stabilization /functional activity            Timed:         Manual Therapy:    8     mins  03444;     Therapeutic Exercise:    23     mins  55893;     Neuromuscular Agustin:        mins  64538;    Therapeutic Activity:          mins   57931;     Gait Training:           mins  66238;     Ultrasound:          mins  74985;    Ionto                                   mins   16700  Self Care                            mins   78831    Un-Timed:  Electrical Stimulation:  15       mins  75278 ( );  Traction          mins 54337    Timed Treatment:   31   mins   Total Treatment:     46   mins     Pili Pemberton PT, DPT, OCS     [place] License # [id number]

## 2023-09-05 ENCOUNTER — TREATMENT (OUTPATIENT)
Dept: PHYSICAL THERAPY | Facility: CLINIC | Age: 53
End: 2023-09-05
Payer: OTHER MISCELLANEOUS

## 2023-09-05 DIAGNOSIS — M75.121 COMPLETE TEAR OF RIGHT ROTATOR CUFF, UNSPECIFIED WHETHER TRAUMATIC: Primary | ICD-10-CM

## 2023-09-05 DIAGNOSIS — Z47.89 ORTHOPEDIC AFTERCARE: ICD-10-CM

## 2023-09-05 DIAGNOSIS — M25.511 ACUTE PAIN OF RIGHT SHOULDER: ICD-10-CM

## 2023-09-05 PROCEDURE — 97140 MANUAL THERAPY 1/> REGIONS: CPT | Performed by: PHYSICAL THERAPIST

## 2023-09-05 PROCEDURE — 97110 THERAPEUTIC EXERCISES: CPT | Performed by: PHYSICAL THERAPIST

## 2023-09-05 PROCEDURE — 97014 ELECTRIC STIMULATION THERAPY: CPT | Performed by: PHYSICAL THERAPIST

## 2023-09-05 PROCEDURE — 97112 NEUROMUSCULAR REEDUCATION: CPT | Performed by: PHYSICAL THERAPIST

## 2023-09-05 NOTE — PROGRESS NOTES
Physical Therapy Daily Note  Office: [address] [facility] [address], [place] [address]  P: [phone]  F: [phone]    Patient: Amanda Guzman   : 1970  Diagnosis/ICD-10 Code:  Complete tear of right rotator cuff, unspecified whether traumatic [M75.121]  Referring practitioner: Justin Goode, *  Date of Initial Visit: 2023  Today's Date: 2023  Patient seen for 38 sessions                                                                                                                                                                                                                                                                                                                               VISIT#: 38/45 sessions authorized per ins     Precautions/Restrictions: R RTC repair on 3/31/2023    Subjective  Amanda Guzman reports that she is doing better. Not having as much pain in the forearm or shoulder.     Objective  134 degrees AA flex supine; AA ER to neutral supine     See Exercise, Manual, and Modality Logs for complete treatment.       Assessment/Plan   Pt able to perform strengthening exercises well this date without c/o pain in the shoulder or forearm. Fatigue noted by end of session. Pt continues to have some weakness in the R UE overall, but is improving.     Progress per Plan of Care and Progress strengthening /stabilization /functional activity            Timed:         Manual Therapy:    8     mins  89392;     Therapeutic Exercise:    8     mins  95847;     Neuromuscular Agustin:   8     mins  21011;    Therapeutic Activity:          mins  98458;     Gait Training:           mins  89729;     Ultrasound:          mins  59605;    Ionto                                   mins   86000  Self Care                            mins   52052    Un-Timed:  Electrical Stimulation:  15       mins  81813 (MC );  Traction          mins 75067    Timed Treatment:   24   mins   Total Treatment:     39   mins (pt  in clinic for ~55 mins total)     Pili Pemberton, PT, DPT, OCS     [place] License # [id number]

## 2023-09-07 ENCOUNTER — TREATMENT (OUTPATIENT)
Dept: PHYSICAL THERAPY | Facility: CLINIC | Age: 53
End: 2023-09-07
Payer: OTHER MISCELLANEOUS

## 2023-09-07 DIAGNOSIS — M25.511 ACUTE PAIN OF RIGHT SHOULDER: ICD-10-CM

## 2023-09-07 DIAGNOSIS — M75.121 COMPLETE TEAR OF RIGHT ROTATOR CUFF, UNSPECIFIED WHETHER TRAUMATIC: Primary | ICD-10-CM

## 2023-09-07 DIAGNOSIS — Z47.89 ORTHOPEDIC AFTERCARE: ICD-10-CM

## 2023-09-07 PROCEDURE — 97140 MANUAL THERAPY 1/> REGIONS: CPT | Performed by: PHYSICAL THERAPIST

## 2023-09-07 PROCEDURE — 97112 NEUROMUSCULAR REEDUCATION: CPT | Performed by: PHYSICAL THERAPIST

## 2023-09-07 PROCEDURE — 97014 ELECTRIC STIMULATION THERAPY: CPT | Performed by: PHYSICAL THERAPIST

## 2023-09-07 PROCEDURE — 97110 THERAPEUTIC EXERCISES: CPT | Performed by: PHYSICAL THERAPIST

## 2023-09-07 NOTE — PROGRESS NOTES
Physical Therapy Daily Note  Office: [address] [facility] [address], [place] [address]  P: [phone]  F: [phone]    Patient: Amanda Guzman   : 1970  Diagnosis/ICD-10 Code:  Complete tear of right rotator cuff, unspecified whether traumatic [M75.121]  Referring practitioner: Justin Goode, *  Date of Initial Visit: 2023  Today's Date: 2023  Patient seen for 39 sessions                                                                                                                                                                                                                                                                                                                               VISIT#: 39/45 sessions authorized per ins     Precautions/Restrictions: R RTC repair on 3/31/2023    Subjective  Amanda Guzman reports that she was doing some stretches yesterday and drawing the alphabet with her arm and felt a pull in the back of her arm when drawing a big C and the back of the shoulder has been hurting since then.     Objective  Tenderness posterior upper arm     See Exercise, Manual, and Modality Logs for complete treatment.       Assessment/Plan   Pt demonstrates some improvement in tenderness in the posterior upper arm after manual treatment. Able to perform exercises, however had some increased pain with reaching past ~120 degrees so avoided painful ranges. Modalities at end of session. Instructed pt to use heating pad on back of arm at home.     Progress per Plan of Care and Progress strengthening /stabilization /functional activity            Timed:         Manual Therapy:    8     mins  92562;     Therapeutic Exercise:    8     mins  59974;     Neuromuscular Agustin:   8     mins  52048;    Therapeutic Activity:          mins  06958;     Gait Training:           mins  06256;     Ultrasound:          mins  57315;    Ionto                                   mins   99661  Self Care                             mins   61275    Un-Timed:  Electrical Stimulation:  15       mins  85911 ( );  Traction          mins 60701    Timed Treatment:   24   mins   Total Treatment:     39   mins (pt in clinic for ~55 mins total)     Pili Pemberton, PT, DPT, OCS     [place] License # [id number]

## 2023-09-12 ENCOUNTER — TREATMENT (OUTPATIENT)
Dept: PHYSICAL THERAPY | Facility: CLINIC | Age: 53
End: 2023-09-12
Payer: OTHER MISCELLANEOUS

## 2023-09-12 DIAGNOSIS — M75.121 COMPLETE TEAR OF RIGHT ROTATOR CUFF, UNSPECIFIED WHETHER TRAUMATIC: Primary | ICD-10-CM

## 2023-09-12 DIAGNOSIS — Z47.89 ORTHOPEDIC AFTERCARE: ICD-10-CM

## 2023-09-12 DIAGNOSIS — M25.511 ACUTE PAIN OF RIGHT SHOULDER: ICD-10-CM

## 2023-09-12 PROCEDURE — 97014 ELECTRIC STIMULATION THERAPY: CPT | Performed by: PHYSICAL THERAPIST

## 2023-09-12 PROCEDURE — 97112 NEUROMUSCULAR REEDUCATION: CPT | Performed by: PHYSICAL THERAPIST

## 2023-09-12 PROCEDURE — 97110 THERAPEUTIC EXERCISES: CPT | Performed by: PHYSICAL THERAPIST

## 2023-09-12 NOTE — PROGRESS NOTES
Physical Therapy Daily Note  Office: [address] [facility] [address], [place] [address]  P: [phone]  F: [phone]    Patient: Amanda Guzman   : 1970  Diagnosis/ICD-10 Code:  Complete tear of right rotator cuff, unspecified whether traumatic [M75.121]  Referring practitioner: Justin Goode, *  Date of Initial Visit: 2023  Today's Date: 2023  Patient seen for 40 sessions                                                                                                                                                                                                                                                                                                                               VISIT#: 40/45 sessions authorized per ins     Precautions/Restrictions: R RTC repair on 3/31/2023    Subjective  Amanda Guzman reports that her shoulder is doing well today. No new complaints.     Objective  Flex AROM to ~130 degrees    See Exercise, Manual, and Modality Logs for complete treatment.       Assessment/Plan   Pt demonstrates mod fatigue with strengthening exercises. Progressed RTC strengthening this date and she had difficulty maintaining proper technique due to weakness in the shoulder. No c/o increased pain in the shoulder.     Progress per Plan of Care and Progress strengthening /stabilization /functional activity            Timed:         Manual Therapy:         mins  20250;     Therapeutic Exercise:    23     mins  74694;     Neuromuscular Agustin:   23     mins  31150;    Therapeutic Activity:          mins  63334;     Gait Training:           mins  63557;     Ultrasound:          mins  02330;    Ionto                                   mins   68361  Self Care                            mins   27045    Un-Timed:  Electrical Stimulation:  15       mins  89742 ( );  Traction          mins 74297    Timed Treatment:   46   mins   Total Treatment:     61   mins     Pili Pemberton, PT, DPT, OCS      [place] License # [id number]

## 2023-09-14 ENCOUNTER — TREATMENT (OUTPATIENT)
Dept: PHYSICAL THERAPY | Facility: CLINIC | Age: 53
End: 2023-09-14
Payer: OTHER MISCELLANEOUS

## 2023-09-14 DIAGNOSIS — M25.511 ACUTE PAIN OF RIGHT SHOULDER: ICD-10-CM

## 2023-09-14 DIAGNOSIS — Z47.89 ORTHOPEDIC AFTERCARE: ICD-10-CM

## 2023-09-14 DIAGNOSIS — M75.121 COMPLETE TEAR OF RIGHT ROTATOR CUFF, UNSPECIFIED WHETHER TRAUMATIC: Primary | ICD-10-CM

## 2023-09-14 PROCEDURE — 97112 NEUROMUSCULAR REEDUCATION: CPT | Performed by: PHYSICAL THERAPIST

## 2023-09-14 PROCEDURE — 97014 ELECTRIC STIMULATION THERAPY: CPT | Performed by: PHYSICAL THERAPIST

## 2023-09-14 PROCEDURE — 97110 THERAPEUTIC EXERCISES: CPT | Performed by: PHYSICAL THERAPIST

## 2023-09-14 NOTE — PROGRESS NOTES
Physical Therapy Daily Note  Office: [address] [facility] [address], [place] [address]  P: [phone]  F: [phone]    Patient: Amanda Guzman   : 1970  Diagnosis/ICD-10 Code:  Complete tear of right rotator cuff, unspecified whether traumatic [M75.121]  Referring practitioner: Justin Goode, *  Date of Initial Visit: 2023  Today's Date: 2023  Patient seen for 41 sessions                                                                                                                                                                                                                                                                                                                               VISIT#: 41/45 sessions authorized per ins     Precautions/Restrictions: R RTC repair on 3/31/2023    Subjective  Amanda Guzman reports that she does feel like her shoulder is getting stronger. Each week it feels a little better. Also able to reach up higher. Still having numbness in fingertips.     Objective  Flex AROM to ~140 degrees    See Exercise, Manual, and Modality Logs for complete treatment.       Assessment/Plan   Pt demonstrates good progress with strengthening with less rest breaks required between sets as well as less fatigue overall. Still does have good fatigue by end of session with added exercises. No c/o increased pain with treatment this date.     Progress per Plan of Care and Progress strengthening /stabilization /functional activity            Timed:         Manual Therapy:         mins  33338;     Therapeutic Exercise:    23     mins  89773;     Neuromuscular Agustin:   23     mins  42236;    Therapeutic Activity:          mins  83823;     Gait Training:           mins  60538;     Ultrasound:          mins  74035;    Ionto                                   mins   79044  Self Care                            mins   30422    Un-Timed:  Electrical Stimulation:  15       mins  38912 (  );  Traction          mins 62072    Timed Treatment:   46   mins   Total Treatment:     61   mins     Pili Pemberton, PT, DPT, OCS     [place] License # [id number]

## 2023-09-21 ENCOUNTER — TREATMENT (OUTPATIENT)
Dept: PHYSICAL THERAPY | Facility: CLINIC | Age: 53
End: 2023-09-21

## 2023-09-21 DIAGNOSIS — M25.511 ACUTE PAIN OF RIGHT SHOULDER: ICD-10-CM

## 2023-09-21 DIAGNOSIS — Z47.89 ORTHOPEDIC AFTERCARE: ICD-10-CM

## 2023-09-21 DIAGNOSIS — M75.121 COMPLETE TEAR OF RIGHT ROTATOR CUFF, UNSPECIFIED WHETHER TRAUMATIC: Primary | ICD-10-CM

## 2023-09-21 PROCEDURE — 97110 THERAPEUTIC EXERCISES: CPT | Performed by: PHYSICAL THERAPIST

## 2023-09-21 PROCEDURE — 97112 NEUROMUSCULAR REEDUCATION: CPT | Performed by: PHYSICAL THERAPIST

## 2023-09-21 PROCEDURE — 97014 ELECTRIC STIMULATION THERAPY: CPT | Performed by: PHYSICAL THERAPIST

## 2023-09-21 NOTE — PROGRESS NOTES
Physical Therapy Daily Note  Office: [address] [facility] [address], [place] [address]  P: [phone]  F: [phone]    Patient: Amanda Guzman   : 1970  Diagnosis/ICD-10 Code:  Complete tear of right rotator cuff, unspecified whether traumatic [M75.121]  Referring practitioner: Justin Goode, *  Date of Initial Visit: 2023  Today's Date: 2023  Patient seen for 42 sessions                                                                                                                                                                                                                                                                                                                               VISIT#: 42/45 sessions authorized per ins     Precautions/Restrictions: R RTC repair on 3/31/2023    Subjective  Amanda Guzman reports that her shoulder is getting stronger. Not having pain in it like she used to either.     Objective  Flex AROM to ~140 degrees    See Exercise, Manual, and Modality Logs for complete treatment.       Assessment/Plan   Pt demonstrates good progress with strength in the shoulder. Able to perform OH activities above 120 degrees with less difficulty, however does continue to fatigue quickly. Good fatigue by end of session. Instructed pt to continue with her HEP, strengthening 3x per week.     Progress per Plan of Care and Progress strengthening /stabilization /functional activity            Timed:         Manual Therapy:         mins  58987;     Therapeutic Exercise:    38     mins  76031;     Neuromuscular Agustin:   8     mins  69815;    Therapeutic Activity:          mins  05715;     Gait Training:           mins  65289;     Ultrasound:          mins  71702;    Ionto                                   mins   08266  Self Care                            mins   66827    Un-Timed:  Electrical Stimulation:  15       mins  04539 ( );  Traction          mins 66257    Timed Treatment:   46    mins   Total Treatment:     61   mins     Pili Pemberton, PT, DPT, OCS     [place] License # [id number]

## 2023-09-28 ENCOUNTER — TREATMENT (OUTPATIENT)
Dept: PHYSICAL THERAPY | Facility: CLINIC | Age: 53
End: 2023-09-28
Payer: OTHER MISCELLANEOUS

## 2023-09-28 DIAGNOSIS — Z47.89 ORTHOPEDIC AFTERCARE: ICD-10-CM

## 2023-09-28 DIAGNOSIS — M75.121 COMPLETE TEAR OF RIGHT ROTATOR CUFF, UNSPECIFIED WHETHER TRAUMATIC: Primary | ICD-10-CM

## 2023-09-28 DIAGNOSIS — M25.511 ACUTE PAIN OF RIGHT SHOULDER: ICD-10-CM

## 2023-09-28 NOTE — PROGRESS NOTES
Physical Therapy Daily Note  Office: [address] [facility] [address], [place] [address]  P: [phone]  F: [phone]    Patient: Amanda Guzman   : 1970  Diagnosis/ICD-10 Code:  Complete tear of right rotator cuff, unspecified whether traumatic [M75.121]  Referring practitioner: Justin Goode, *  Date of Initial Visit: 2023  Today's Date: 2023  Patient seen for 43 sessions                                                                                                                                                                                                                                                                                                                               VISIT#: 43/45 sessions authorized per ins     Precautions/Restrictions: R RTC repair on 3/31/2023    Subjective  Amanda Guzman reports that she has had a little more aching in the shoulder lately. She has been trying to sleep on her side more and has been swinging her arm more when walking so could be from those. Exercises are going well at home. Returns to surgeon next Tuesday for follow-up.     Objective  Flex AROM ~160 degrees    See Exercise, Manual, and Modality Logs for complete treatment.       Assessment/Plan   Pt demonstrates much improvement in ROM of the R shoulder. Is WFL with no pain. Has some mild discomfort at end ranges. Also much improvement in strength. Still good fatigue by end of session.     Progress per Plan of Care and Progress strengthening /stabilization /functional activity            Timed:         Manual Therapy:         mins  80758;     Therapeutic Exercise:    23     mins  94076;     Neuromuscular Agustin:   8     mins  62625;    Therapeutic Activity:          mins  52200;     Gait Training:           mins  35498;     Ultrasound:          mins  59095;    Ionto                                   mins   00328  Self Care                            mins   96110    Un-Timed:  Electrical  Stimulation:  15       mins  84726 ( );  Traction          mins 32017    Timed Treatment:   31   mins   Total Treatment:     46   mins     Pili Pemberton, PT, DPT, OCS     [place] License # [id number]

## 2023-10-02 ENCOUNTER — OFFICE VISIT (OUTPATIENT)
Dept: ORTHOPEDIC SURGERY | Facility: CLINIC | Age: 53
End: 2023-10-02
Payer: OTHER MISCELLANEOUS

## 2023-10-02 VITALS — HEART RATE: 97 BPM | BODY MASS INDEX: 38.57 KG/M2 | HEIGHT: 66 IN | WEIGHT: 240 LBS

## 2023-10-02 DIAGNOSIS — M75.121 COMPLETE TEAR OF RIGHT ROTATOR CUFF, UNSPECIFIED WHETHER TRAUMATIC: Primary | ICD-10-CM

## 2023-10-02 NOTE — PROGRESS NOTES
"     Patient ID: Amanda Guzman is a 52 y.o. female.  3/31/23 right shoulder arthroscopy with capsular release and supraspinatus repair    Pain improving doing well in therapy    Review of Systems:        Objective:    Pulse 97   Ht 167.6 cm (66\")   Wt 109 kg (240 lb)   BMI 38.74 kg/m²     Physical Examination:     Right shoulder passive elevation 170 abduction 140 external rotation 60 no pain or weakness on Speed Zirconia supraspinatus testing.  Belly press and lift off are 5/5    Imaging:       Assessment:    Doing well after cuff repair    Plan:   Finish out formal therapy return to work without restrictions active or night see me as needed      Procedures          Disclaimer: Part of this note may be an electronic transcription/translation of spoken language to printed text using the Dragon Dictation System  "

## 2023-10-03 ENCOUNTER — TREATMENT (OUTPATIENT)
Dept: PHYSICAL THERAPY | Facility: CLINIC | Age: 53
End: 2023-10-03
Payer: OTHER MISCELLANEOUS

## 2023-10-03 DIAGNOSIS — M25.511 ACUTE PAIN OF RIGHT SHOULDER: ICD-10-CM

## 2023-10-03 DIAGNOSIS — Z47.89 ORTHOPEDIC AFTERCARE: ICD-10-CM

## 2023-10-03 DIAGNOSIS — M75.121 COMPLETE TEAR OF RIGHT ROTATOR CUFF, UNSPECIFIED WHETHER TRAUMATIC: Primary | ICD-10-CM

## 2023-10-03 PROCEDURE — 97014 ELECTRIC STIMULATION THERAPY: CPT | Performed by: PHYSICAL THERAPIST

## 2023-10-03 PROCEDURE — 97110 THERAPEUTIC EXERCISES: CPT | Performed by: PHYSICAL THERAPIST

## 2023-10-03 PROCEDURE — 97112 NEUROMUSCULAR REEDUCATION: CPT | Performed by: PHYSICAL THERAPIST

## 2023-10-03 NOTE — PROGRESS NOTES
Physical Therapy Daily Note  Office: [address] [facility] [address], [place] [address]  P: [phone]  F: [phone]    Patient: Amanda Guzman   : 1970  Diagnosis/ICD-10 Code:  Complete tear of right rotator cuff, unspecified whether traumatic [M75.121]  Referring practitioner: Justin Goode, *  Date of Initial Visit: 10/3/2023  Today's Date: 10/3/2023  Patient seen for 44 sessions                                                                                                                                                                                                                                                                                                                               VISIT#: 44/45 sessions authorized per ins     Precautions/Restrictions: R RTC repair on 3/31/2023    Subjective  Amanda Guzman reports that her shoulder has been doing well. 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     Objective  Flex AROM ~160 degrees    See Exercise, Manual, and Modality Logs for complete treatment.       Assessment/Plan   Pt demonstrates more fatigue with increased resistance for triceps. Gave pt blue theraband for home to progress her triceps ext and rows. Pt continues to show improvement in strength and overall endurance. Added wall push-ups and pt able to perform well without pain and good control.    Progress per Plan of Care and Progress strengthening /stabilization /functional activity            Timed:         Manual Therapy:         mins  10822;     Therapeutic Exercise:    23     mins  05450;     Neuromuscular Agustin:   8     mins  87013;    Therapeutic Activity:          mins  21296;     Gait Training:           mins  30601;     Ultrasound:          mins  21601;    Ionto                                   mins   68472  Self Care                            mins   47634    Un-Timed:  Electrical Stimulation:  15       mins   17677 ( );  Traction          mins 84009    Timed Treatment:   31   mins   Total Treatment:     46   mins     Pili Pemberton, PT, DPT, OCS     [place] License # [id number]

## 2023-10-12 ENCOUNTER — TELEPHONE (OUTPATIENT)
Dept: PHYSICAL THERAPY | Facility: OTHER | Age: 53
End: 2023-10-12
Payer: COMMERCIAL

## 2023-10-12 ENCOUNTER — TELEPHONE (OUTPATIENT)
Dept: PHYSICAL THERAPY | Facility: CLINIC | Age: 53
End: 2023-10-12
Payer: COMMERCIAL

## 2023-10-12 NOTE — TELEPHONE ENCOUNTER
Caller: Amanda Guzman    Relationship: Self    What was the call regarding: PATIENT CANCELLED APPT TODAY BECAUSE THEY CANT MAKE IT

## 2024-09-24 NOTE — TELEPHONE ENCOUNTER
Caller: ANIYAH ZUNIGA    Relationship to patient: SELF    Best call back number: [phone]    Chief complaint: FOLLOW UP MRI RIGHT SHOULDER    Type of visit: FOLLOW UP MRI RIGHT SHOULDER    Requested date: ASAP     If rescheduling, when is the original appointment: 02/16/2023    Additional notes: PATIENT TESTED POSITIVE FOR COVID AND WANTS TO BE SEEN AS SOON AS POSSIBLE.  PATIENT HAS MRI SCHEDULED FOR 02/24/2023.  PATIENT WOULD NOT ACCEPT ANY AVAILABILITY AFTER QUARANTINE TIME.          
Left message to check pharmacy on 2/13/2023 Meloxicam was sent into pharmacy.    
PATIENT HAS COVID AND WAS SUPPOSED TO COME IN TODAY FOR INCREASED PAIN BUT CAN'T NOW DUE TO COVID, PATIENT WOULD LIKE TO KNOW IF YOU COULD SEND IT 5 DAYS WORTH OF ANTIINFLAMMATORY MEDS.   
See message 
She is on mobic it l ooks like
24-Sep-2024 11:35

## 2025-01-09 ENCOUNTER — DOCUMENTATION (OUTPATIENT)
Dept: PHYSICAL THERAPY | Facility: CLINIC | Age: 55
End: 2025-01-09
Payer: COMMERCIAL

## 2025-01-09 NOTE — PROGRESS NOTES
Discharge Summary  Discharge Summary from Physical Therapy Report      Date of Initial PT visit: 4/6/2023  Number of Visits Seen: 44     Discharge Status of Patient:   At last visit: Pt demonstrates more fatigue with increased resistance for triceps. Gave pt blue theraband for home to progress her triceps ext and rows. Pt continues to show improvement in strength and overall endurance. Added wall push-ups and pt able to perform well without pain and good control.     Goals: Partially Met    Discharge Plan: Continue with current home exercise program as instructed    Comments: Pt attended 44 visits, however did not attend last scheduled visit because she couldn't make it. Unable to do full reassess.     Date of Discharge: 1/9/2025      Pili Pemberton, PT, DPT, OCS     [place] License # [id number]     [place] License # [id number]

## (undated) DEVICE — CANN PASSPORT BUTN 8MM 3CM

## (undated) DEVICE — PK SHLDR ARTHROSCOPY 50

## (undated) DEVICE — GLV SURG SIGNATURE ESSENTIAL PF LTX SZ8

## (undated) DEVICE — CVR HNDL LT SURG ACCSSRY BLU STRL

## (undated) DEVICE — BLD CUT FORMLA AGGR ANGL 4MM

## (undated) DEVICE — TUBING, SUCTION, 1/4" X 12', STRAIGHT: Brand: MEDLINE

## (undated) DEVICE — TBG ARTHSCP PUMP W CONN/REDUC 8FT

## (undated) DEVICE — SUTURELASSO CRV 25D LT

## (undated) DEVICE — SUTURELASSO CRV 25D RT

## (undated) DEVICE — CANN TRPL DAM 7X7MM

## (undated) DEVICE — UNDERGLV SURG BIOGEL/PI PF SYNTH SURG SZ8.5 BLU 50/BX

## (undated) DEVICE — UNDERGLV SURG BIOGEL INDICAT PI SZ8 BLU

## (undated) DEVICE — GLV SURG SENSICARE PI ORTHO SZ8 LF STRL

## (undated) DEVICE — SLV SCD CALF HEMOFORCE DVT THERP REPROC MD

## (undated) DEVICE — GOWN,SLEEVE,STERILE,W/CSR WRAP,1/P: Brand: MEDLINE

## (undated) DEVICE — TBG ARTHSCP PT W CONN/REDUC 8FT

## (undated) DEVICE — SPNG GZ AVANT 6PLY 4X4IN STRL PK/2

## (undated) DEVICE — GLV SURG SENSICARE PI ORTHO SZ7.5 LF STRL

## (undated) DEVICE — PAD,ABDOMINAL,5"X9",STERILE,LF,1/PK: Brand: MEDLINE INDUSTRIES, INC.

## (undated) DEVICE — TBG ARTHSCP DUALWAVE

## (undated) DEVICE — ABL ASP APOLLO RF XL 90D

## (undated) DEVICE — ADHS LIQ MASTISOL 2/3ML

## (undated) DEVICE — KT SURG TURNOVER 050

## (undated) DEVICE — GLV SURG SIGNATURE ESSENTIAL PF LTX SZ8.5

## (undated) DEVICE — SOL IRR NACL 0.9PCT ARTHROMATIC 3000ML

## (undated) DEVICE — GLV SURG SENSICARE PI LF PF 8 GRN STRL

## (undated) DEVICE — SYR PLATLT/RICH/PLASM ACP/2 W/CP

## (undated) DEVICE — ANTIBACTERIAL UNDYED BRAIDED (POLYGLACTIN 910), SYNTHETIC ABSORBABLE SUTURE: Brand: COATED VICRYL

## (undated) DEVICE — GLV SURG SENSICARE PI ORTHO SZ8.5 LF STRL

## (undated) DEVICE — SUT PDS 1 CTX 36IN VIO PDP371T